# Patient Record
Sex: FEMALE | Race: WHITE
[De-identification: names, ages, dates, MRNs, and addresses within clinical notes are randomized per-mention and may not be internally consistent; named-entity substitution may affect disease eponyms.]

---

## 2017-04-30 ENCOUNTER — HOSPITAL ENCOUNTER (INPATIENT)
Dept: HOSPITAL 47 - EC | Age: 57
LOS: 2 days | Discharge: HOME | DRG: 645 | End: 2017-05-02
Payer: MEDICARE

## 2017-04-30 DIAGNOSIS — G40.909: ICD-10-CM

## 2017-04-30 DIAGNOSIS — E22.2: Primary | ICD-10-CM

## 2017-04-30 DIAGNOSIS — Z90.49: ICD-10-CM

## 2017-04-30 DIAGNOSIS — Z79.899: ICD-10-CM

## 2017-04-30 DIAGNOSIS — F41.9: ICD-10-CM

## 2017-04-30 DIAGNOSIS — F17.200: ICD-10-CM

## 2017-04-30 DIAGNOSIS — F31.9: ICD-10-CM

## 2017-04-30 DIAGNOSIS — Z79.82: ICD-10-CM

## 2017-04-30 DIAGNOSIS — J45.909: ICD-10-CM

## 2017-04-30 LAB
ALP SERPL-CCNC: 69 U/L (ref 38–126)
ALT SERPL-CCNC: 22 U/L (ref 9–52)
ANION GAP SERPL CALC-SCNC: 4 MMOL/L
ANION GAP SERPL CALC-SCNC: 6 MMOL/L
ANION GAP SERPL CALC-SCNC: 8 MMOL/L
AST SERPL-CCNC: 31 U/L (ref 14–36)
BASOPHILS # BLD AUTO: 0.1 K/UL (ref 0–0.2)
BASOPHILS NFR BLD AUTO: 1 %
BUN SERPL-SCNC: 10 MG/DL (ref 7–17)
CALCIUM SPEC-MCNC: 8.8 MG/DL (ref 8.4–10.2)
CH: 34.3
CHCM: 35.7
CHLORIDE SERPL-SCNC: 86 MMOL/L (ref 98–107)
CHLORIDE SERPL-SCNC: 88 MMOL/L (ref 98–107)
CHLORIDE SERPL-SCNC: 91 MMOL/L (ref 98–107)
CO2 SERPL-SCNC: 23 MMOL/L (ref 22–30)
CO2 SERPL-SCNC: 25 MMOL/L (ref 22–30)
CO2 SERPL-SCNC: 26 MMOL/L (ref 22–30)
EOSINOPHIL # BLD AUTO: 0 K/UL (ref 0–0.7)
EOSINOPHIL NFR BLD AUTO: 1 %
ERYTHROCYTE [DISTWIDTH] IN BLOOD BY AUTOMATED COUNT: 3.62 M/UL (ref 3.8–5.4)
ERYTHROCYTE [DISTWIDTH] IN BLOOD: 12.5 % (ref 11.5–15.5)
GLUCOSE SERPL-MCNC: 82 MG/DL (ref 74–99)
HCT VFR BLD AUTO: 34.9 % (ref 34–46)
HDW: 2.2
HGB BLD-MCNC: 12.2 GM/DL (ref 11.4–16)
LUC NFR BLD AUTO: 3 %
LYMPHOCYTES # SPEC AUTO: 1.6 K/UL (ref 1–4.8)
LYMPHOCYTES NFR SPEC AUTO: 34 %
MCH RBC QN AUTO: 33.7 PG (ref 25–35)
MCHC RBC AUTO-ENTMCNC: 35 G/DL (ref 31–37)
MCV RBC AUTO: 96.3 FL (ref 80–100)
MONOCYTES # BLD AUTO: 0.3 K/UL (ref 0–1)
MONOCYTES NFR BLD AUTO: 7 %
NEUTROPHILS # BLD AUTO: 2.5 K/UL (ref 1.3–7.7)
NEUTROPHILS NFR BLD AUTO: 54 %
NON-AFRICAN AMERICAN GFR(MDRD): >60
PH UR: 7 [PH] (ref 5–8)
POTASSIUM SERPL-SCNC: 4.3 MMOL/L (ref 3.5–5.1)
POTASSIUM SERPL-SCNC: 4.5 MMOL/L (ref 3.5–5.1)
POTASSIUM SERPL-SCNC: 4.8 MMOL/L (ref 3.5–5.1)
PROT SERPL-MCNC: 7.2 G/DL (ref 6.3–8.2)
SODIUM SERPL-SCNC: 118 MMOL/L (ref 137–145)
SODIUM SERPL-SCNC: 119 MMOL/L (ref 137–145)
SODIUM SERPL-SCNC: 120 MMOL/L (ref 137–145)
SODIUM UR-SCNC: 23 MMOL/L (ref 30–90)
SP GR UR: 1.01 (ref 1–1.03)
UA BILLING (MACRO VS. MICRO): (no result)
URATE SERPL-MCNC: 1.1 MG/DL (ref 3.7–7.4)
UROBILINOGEN UR QL STRIP: <2 MG/DL (ref ?–2)
WBC # BLD AUTO: 0.14 10*3/UL
WBC # BLD AUTO: 4.6 K/UL (ref 3.8–10.6)
WBC (PEROX): 4.44

## 2017-04-30 PROCEDURE — 83935 ASSAY OF URINE OSMOLALITY: CPT

## 2017-04-30 PROCEDURE — 84300 ASSAY OF URINE SODIUM: CPT

## 2017-04-30 PROCEDURE — 84443 ASSAY THYROID STIM HORMONE: CPT

## 2017-04-30 PROCEDURE — 36415 COLL VENOUS BLD VENIPUNCTURE: CPT

## 2017-04-30 PROCEDURE — 80164 ASSAY DIPROPYLACETIC ACD TOT: CPT

## 2017-04-30 PROCEDURE — 80053 COMPREHEN METABOLIC PANEL: CPT

## 2017-04-30 PROCEDURE — 81003 URINALYSIS AUTO W/O SCOPE: CPT

## 2017-04-30 PROCEDURE — 80051 ELECTROLYTE PANEL: CPT

## 2017-04-30 PROCEDURE — 82570 ASSAY OF URINE CREATININE: CPT

## 2017-04-30 PROCEDURE — 84550 ASSAY OF BLOOD/URIC ACID: CPT

## 2017-04-30 PROCEDURE — 99284 EMERGENCY DEPT VISIT MOD MDM: CPT

## 2017-04-30 PROCEDURE — 85025 COMPLETE CBC W/AUTO DIFF WBC: CPT

## 2017-04-30 RX ADMIN — DIVALPROEX SODIUM SCH MG: 500 TABLET, DELAYED RELEASE ORAL at 21:16

## 2017-04-30 RX ADMIN — QUETIAPINE SCH MG: 200 TABLET, EXTENDED RELEASE ORAL at 22:46

## 2017-04-30 RX ADMIN — CEFAZOLIN SCH MLS/HR: 330 INJECTION, POWDER, FOR SOLUTION INTRAMUSCULAR; INTRAVENOUS at 15:40

## 2017-04-30 NOTE — HP
DATE OF ADMISSION:  04/30/2017



A 57-year-old white female with hyponatremia and weakness. 



HISTORY OF PRESENT ILLNESS: This is a 57-year-old white female, 

history of bipolar disorder and seizure disorder, has been on the same 

medicines for the past 2 years, developed hyponatremia 3 weeks ago. 

Her private doctor sent her to the emergency room today due to 

hyponatremia. Sodium was very low. She has been on fluid restriction. 

She states she drinks 6 to 8 drinks of water a day in big large 

glasses. Denies burning of the urine, trouble with urination.  



MEDICATIONS: 

1. Xanax 1 mg daily. 

2. Trazodone 100 daily. 

3. Depakote 500 b.i.d.

4.  Prozac 10 mg daily. 

5. Linzess 290 daily. 

6. Tramadol 50 mg daily. 



ALLERGIES: CODEINE.



REVIEW OF SYSTEMS: A 14-point review of systems is negative except for 

the weakness.  



PAST MEDICAL HISTORY: 2 packs a day smoking. No alcohol or illicit 

drugs.  



FAMILY HISTORY: Father with hypertension. Mother with macular 

degeneration.  



PHYSICAL EXAM: 

VITAL SIGNS: Stable, afebrile.  Temperature 97, pulse 88, respirations 

18 to 20.  

CARDIOVASCULAR: S1, S2. 

LUNGS: Clear. 

GI: Soft. 

HEMATOLOGIC: Negative Homans. 

EXTREMITIES: No cyanosis, clubbing, edema. 

PSYCHIATRIC: Fair mood and normal affect. 

NEUROLOGIC: Alert and oriented x3. 

VASCULAR: Normal dorsalis pedis, posterior tibial, and radial pulse. 

OPHTHALMOLOGIC: Pupils equal, round and accommodation. She is in no 

acute distress.  



ASSESSMENT: 

1. Hyponatremia. 

2. Weakness secondary to above. 

3. Bipolar disorder. 

4. Seizure disorder. 



PLAN: Slow rehydration with normal saline at 75 an hour. Consult with 

renal physician for hyponatremia. Check labs in the morning.

## 2017-04-30 NOTE — ED
Recheck HPI





- General


Source: patient, RN notes reviewed


Mode of arrival: ambulatory


Limitations: no limitations





<Carina See - Last Filed: 04/30/17 13:44>





<Hao Benitez - Last Filed: 04/30/17 13:56>





- General


Chief Complaint: Recheck/Abnormal Lab/Rx


Stated Complaint: SODIUM LOW


Time Seen by Provider: 04/30/17 11:49





- History of Present Illness


Initial Comments: 





Patient is a 57-year-old female since emergency room for evaluation.  Patient 

states she has not been feeling himself for the past 2 weeks.  Patient states 

out of the primary care provider last Tuesday and was told that her sodium was 

very low.  Patient states she is on fluid restriction had labs redrawn on 

Monday.  Patient states she got a phone call today stating that she needs to 

come to the emergency room because of low sodium.  Patient denies chest pain.  

Patient denies shortness of breath.  Patient denies nausea or vomiting.  

Patient denies abdominal pain.  Patient states she's been experiencing left 

flank pain for the past few weeks and is being on following up with a 

nephrologist.  Patient denies any history of any issues with her kidneys.  She 

denies pain or burning during urination, trouble urinating or blood in urine. (

Carina See)





- Related Data


 Home Medications











 Medication  Instructions  Recorded  Confirmed


 


ALPRAZolam [Xanax] 1 mg PO DAILY PRN 07/17/16 04/30/17


 


traZODone  mg PO HS 07/17/16 04/30/17


 


Divalproex Sodium [Depakote] 500 mg PO BID 04/30/17 04/30/17


 


FLUoxetine HCL [PROzac] 10 mg PO DAILY 04/30/17 04/30/17


 


Linaclotide [Linzess] 290 mcg PO QAM 04/30/17 04/30/17


 


traMADol HCl [Ultram] 50 mg PO DAILY PRN 04/30/17 04/30/17








 Previous Rx's











 Medication  Instructions  Recorded


 


OXcarbazepine [Trileptal] 300 mg PO BID  tab 07/19/16











 Allergies











Allergy/AdvReac Type Severity Reaction Status Date / Time


 


codeine Allergy  Rash/Hives Verified 04/30/17 11:57














Review of Systems


ROS Other: All systems not noted in ROS Statement are negative.





<Carina See - Last Filed: 04/30/17 13:44>


ROS Other: All systems not noted in ROS Statement are negative.





<AparnaHao christine - Last Filed: 04/30/17 13:56>


ROS Statement: 


Those systems with pertinent positive or pertinent negative responses have been 

documented in the HPI.








Past Medical History


Additional Past Medical History / Comment(s): anxiety, depression/bipolar, 

bowel obstruction hx of iatraogenic bowel injury during gynecological case/

adhesions, constipation,emphysema.


History of Any Multi-Drug Resistant Organisms: None Reported


Past Surgical History: Appendectomy, Bowel Resection, Cholecystectomy, 

Hysterectomy


Additional Past Surgical History / Comment(s): rotator cuff repair.


Past Anesthesia/Blood Transfusion Reactions: No Reported Reaction


Past Psychological History: Anxiety, Bipolar, Depression


Additional Psychological History / Comment(s): at time of admission pt denies 

having any problem with depression


Smoking Status: Current every day smoker


Past Alcohol Use History: None Reported


Additional Past Alcohol Use History / Comment(s): pt states she is trying to 

quit smoking and is currently smoking 2 ppd


Past Drug Use History: None Reported


Additional Drug Use History / Comment(s): smoked last 1-21-16





- Past Family History


  ** Father


Family Medical History: Hypertension





  ** Mother


Additional Family Medical History / Comment(s): Macular degeneration





<Carina See - Last Filed: 04/30/17 13:44>





General Exam


Limitations: no limitations


General appearance: alert, in no apparent distress


Head exam: Present: atraumatic, normocephalic, normal inspection


Eye exam: Present: normal appearance


ENT exam: Present: normal exam


Neck exam: Present: normal inspection


Respiratory exam: Present: normal lung sounds bilaterally.  Absent: respiratory 

distress


Cardiovascular Exam: Present: regular rate, normal rhythm, normal heart sounds


GI/Abdominal exam: Present: soft, normal bowel sounds.  Absent: distended, 

tenderness, guarding, rebound, rigid


Extremities exam: Present: normal inspection


Back exam: Present: normal inspection


Neurological exam: Present: alert, oriented X3, CN II-XII intact, normal gait


Psychiatric exam: Present: normal affect, normal mood


Skin exam: Present: warm, dry, intact, normal color.  Absent: rash





<Carina See - Last Filed: 04/30/17 13:44>


General appearance: alert, in no apparent distress


Head exam: Present: atraumatic, normocephalic, normal inspection


Eye exam: Present: normal appearance, PERRL, EOMI.  Absent: scleral icterus, 

conjunctival injection, periorbital swelling


ENT exam: Present: normal exam, mucous membranes moist


Neck exam: Present: normal inspection.  Absent: tenderness, meningismus, 

lymphadenopathy


Respiratory exam: Present: normal lung sounds bilaterally.  Absent: respiratory 

distress, wheezes, rales, rhonchi, stridor


Cardiovascular Exam: Present: regular rate, normal rhythm, normal heart sounds.

  Absent: systolic murmur, diastolic murmur, rubs, gallop, clicks


GI/Abdominal exam: Present: soft, normal bowel sounds.  Absent: distended, 

tenderness, guarding, rebound, rigid


Extremities exam: Present: normal inspection, full ROM, normal capillary 

refill.  Absent: tenderness, pedal edema, joint swelling, calf tenderness


Back exam: Present: normal inspection


Neurological exam: Present: alert, oriented X3, CN II-XII intact


Psychiatric exam: Present: normal affect, normal mood


Skin exam: Present: warm, dry, intact, normal color.  Absent: rash





<Hao Benitez - Last Filed: 04/30/17 13:56>





- General Exam Comments


Initial Comments: 





Sitting in exam room, no distress. (Carina See)





Course





<Carina See - Last Filed: 04/30/17 13:44>





<Hao Benitez - Last Filed: 04/30/17 13:56>





 Vital Signs











  04/30/17





  11:08


 


Temperature 97 F L


 


Pulse Rate 88


 


Respiratory 20





Rate 


 


Blood Pressure 111/7


 


O2 Sat by Pulse 99





Oximetry 














- Reevaluation(s)


Reevaluation #1: 





04/30/17 13:56


Patient remains without complaint, no neurological complaints, weak (Hao Benitez)





Medical Decision Making





- Lab Data


Result diagrams: 


 04/30/17 12:49





 04/30/17 12:49





<Carina See - Last Filed: 04/30/17 13:44>





- Lab Data


Result diagrams: 


 04/30/17 12:49





 04/30/17 12:49





<Hao Benitez - Last Filed: 04/30/17 13:56>





- Medical Decision Making





49 female here for evaluation, patient with a near for evaluation of abnormal 

lab, hyponatremia, patient be admitted for correcting of sodium (Hao Benitez)





- Lab Data





 Lab Results











  04/30/17 04/30/17 04/30/17 Range/Units





  12:49 12:49 12:58 


 


WBC  4.6    (3.8-10.6)  k/uL


 


RBC  3.62 L    (3.80-5.40)  m/uL


 


Hgb  12.2    (11.4-16.0)  gm/dL


 


Hct  34.9    (34.0-46.0)  %


 


MCV  96.3    (80.0-100.0)  fL


 


MCH  33.7    (25.0-35.0)  pg


 


MCHC  35.0    (31.0-37.0)  g/dL


 


RDW  12.5    (11.5-15.5)  %


 


Plt Count  264    (150-450)  k/uL


 


Neutrophils %  54    %


 


Lymphocytes %  34    %


 


Monocytes %  7    %


 


Eosinophils %  1    %


 


Basophils %  1    %


 


Neutrophils #  2.5    (1.3-7.7)  k/uL


 


Lymphocytes #  1.6    (1.0-4.8)  k/uL


 


Monocytes #  0.3    (0-1.0)  k/uL


 


Eosinophils #  0.0    (0-0.7)  k/uL


 


Basophils #  0.1    (0-0.2)  k/uL


 


Sodium   119 L*   (137-145)  mmol/L


 


Potassium   4.8   (3.5-5.1)  mmol/L


 


Chloride   88 L   ()  mmol/L


 


Carbon Dioxide   25   (22-30)  mmol/L


 


Anion Gap   6   mmol/L


 


BUN   10   (7-17)  mg/dL


 


Creatinine   0.66   (0.52-1.04)  mg/dL


 


Est GFR (MDRD) Af Amer   >60   (>60 ml/min/1.73 sqM)  


 


Est GFR (MDRD) Non-Af   >60   (>60 ml/min/1.73 sqM)  


 


Glucose   82   (74-99)  mg/dL


 


Calcium   8.8   (8.4-10.2)  mg/dL


 


Total Bilirubin   0.4   (0.2-1.3)  mg/dL


 


AST   31   (14-36)  U/L


 


ALT   22   (9-52)  U/L


 


Alkaline Phosphatase   69   ()  U/L


 


Total Protein   7.2   (6.3-8.2)  g/dL


 


Albumin   4.0   (3.5-5.0)  g/dL


 


Urine Color    Yellow  


 


Urine Appearance    Clear  (Clear)  


 


Urine pH    7.0  (5.0-8.0)  


 


Ur Specific Gravity    1.009  (1.001-1.035)  


 


Urine Protein    Negative  (Negative)  


 


Urine Glucose (UA)    Negative  (Negative)  


 


Urine Ketones    Trace H  (Negative)  


 


Urine Blood    Negative  (Negative)  


 


Urine Nitrite    Negative  (Negative)  


 


Urine Bilirubin    Negative  (Negative)  


 


Urine Urobilinogen    <2.0  (<2.0)  mg/dL


 


Ur Leukocyte Esterase    Negative  (Negative)  














Disposition


Decision Date: 04/30/17





<Carina See - Last Filed: 04/30/17 13:44>





<Hao Benitez - Last Filed: 04/30/17 13:56>


Clinical Impression: 


 Hyponatremia





Disposition: ADMITTED AS IP TO THIS HOSP


Condition: Stable


Referrals: 


Brian Pruitt MD [Primary Care Provider] - 1-2 days

## 2017-05-01 LAB
ALP SERPL-CCNC: 64 U/L (ref 38–126)
ALP SERPL-CCNC: 94 U/L (ref 38–126)
ALT SERPL-CCNC: 22 U/L (ref 9–52)
ALT SERPL-CCNC: 29 U/L (ref 9–52)
ANION GAP SERPL CALC-SCNC: 10 MMOL/L
ANION GAP SERPL CALC-SCNC: 10 MMOL/L
ANION GAP SERPL CALC-SCNC: 5 MMOL/L
ANION GAP SERPL CALC-SCNC: 7 MMOL/L
AST SERPL-CCNC: 25 U/L (ref 14–36)
AST SERPL-CCNC: 28 U/L (ref 14–36)
BASOPHILS # BLD AUTO: 0 K/UL (ref 0–0.2)
BASOPHILS NFR BLD AUTO: 0 %
BUN SERPL-SCNC: 10 MG/DL (ref 7–17)
BUN SERPL-SCNC: 10 MG/DL (ref 7–17)
CALCIUM SPEC-MCNC: 8.9 MG/DL (ref 8.4–10.2)
CALCIUM SPEC-MCNC: 9.2 MG/DL (ref 8.4–10.2)
CH: 34
CHCM: 34.2
CHLORIDE SERPL-SCNC: 100 MMOL/L (ref 98–107)
CHLORIDE SERPL-SCNC: 102 MMOL/L (ref 98–107)
CHLORIDE SERPL-SCNC: 91 MMOL/L (ref 98–107)
CHLORIDE SERPL-SCNC: 95 MMOL/L (ref 98–107)
CO2 SERPL-SCNC: 20 MMOL/L (ref 22–30)
CO2 SERPL-SCNC: 23 MMOL/L (ref 22–30)
EOSINOPHIL # BLD AUTO: 0.1 K/UL (ref 0–0.7)
EOSINOPHIL NFR BLD AUTO: 1 %
ERYTHROCYTE [DISTWIDTH] IN BLOOD BY AUTOMATED COUNT: 3.9 M/UL (ref 3.8–5.4)
ERYTHROCYTE [DISTWIDTH] IN BLOOD: 13 % (ref 11.5–15.5)
GLUCOSE SERPL-MCNC: 78 MG/DL (ref 74–99)
GLUCOSE SERPL-MCNC: 90 MG/DL (ref 74–99)
HCT VFR BLD AUTO: 39 % (ref 34–46)
HDW: 2.14
HGB BLD-MCNC: 12.9 GM/DL (ref 11.4–16)
LUC NFR BLD AUTO: 2 %
LYMPHOCYTES # SPEC AUTO: 0.9 K/UL (ref 1–4.8)
LYMPHOCYTES NFR SPEC AUTO: 16 %
MCH RBC QN AUTO: 33.1 PG (ref 25–35)
MCHC RBC AUTO-ENTMCNC: 33.2 G/DL (ref 31–37)
MCV RBC AUTO: 99.8 FL (ref 80–100)
MONOCYTES # BLD AUTO: 0.3 K/UL (ref 0–1)
MONOCYTES NFR BLD AUTO: 5 %
NEUTROPHILS # BLD AUTO: 4.4 K/UL (ref 1.3–7.7)
NEUTROPHILS NFR BLD AUTO: 77 %
NON-AFRICAN AMERICAN GFR(MDRD): >60
NON-AFRICAN AMERICAN GFR(MDRD): >60
POTASSIUM SERPL-SCNC: 4.3 MMOL/L (ref 3.5–5.1)
POTASSIUM SERPL-SCNC: 4.6 MMOL/L (ref 3.5–5.1)
POTASSIUM SERPL-SCNC: 4.8 MMOL/L (ref 3.5–5.1)
POTASSIUM SERPL-SCNC: 5.1 MMOL/L (ref 3.5–5.1)
PROT SERPL-MCNC: 6.3 G/DL (ref 6.3–8.2)
PROT SERPL-MCNC: 7.2 G/DL (ref 6.3–8.2)
SODIUM SERPL-SCNC: 119 MMOL/L (ref 137–145)
SODIUM SERPL-SCNC: 125 MMOL/L (ref 137–145)
SODIUM SERPL-SCNC: 132 MMOL/L (ref 137–145)
SODIUM SERPL-SCNC: 133 MMOL/L (ref 137–145)
WBC # BLD AUTO: 0.09 10*3/UL
WBC # BLD AUTO: 5.7 K/UL (ref 3.8–10.6)
WBC (PEROX): 5.46

## 2017-05-01 RX ADMIN — DOCUSATE SODIUM SCH MG: 100 CAPSULE, LIQUID FILLED ORAL at 20:50

## 2017-05-01 RX ADMIN — DIVALPROEX SODIUM SCH MG: 500 TABLET, DELAYED RELEASE ORAL at 09:09

## 2017-05-01 RX ADMIN — QUETIAPINE SCH MG: 200 TABLET, EXTENDED RELEASE ORAL at 22:07

## 2017-05-01 RX ADMIN — DIVALPROEX SODIUM SCH MG: 500 TABLET, DELAYED RELEASE ORAL at 20:50

## 2017-05-01 RX ADMIN — DOCUSATE SODIUM SCH MG: 100 CAPSULE, LIQUID FILLED ORAL at 13:03

## 2017-05-01 RX ADMIN — CEFAZOLIN SCH MLS/HR: 330 INJECTION, POWDER, FOR SOLUTION INTRAMUSCULAR; INTRAVENOUS at 06:58

## 2017-05-01 NOTE — CONS
DATE OF CONSULTATION:  05/01/2017 



REASON FOR CONSULT:  Hyponatremia. 



HISTORY OF PRESENT ILLNESS: Patient is a 57-year-old female with a 

history of seizures, maintained on Depakote and Trileptal. Patient was 

admitted to the hospital for abnormally low sodium done as outpatient 

through her primary care physician's office. Patient stated she had 

been feeling slightly weak prior to her admission, she did state that 

she has had low sodium levels on and off previously. Her sodium was at 

119 mEq/L at the time of admission. Patient did get normal saline and 

she had worsened.  Her urine osmolality was on the high side at 340 

and patient did get a dose of tolvaptan around 6 in the morning. She 

is currently not on any IV fluids and her sodium came up to 125 mEq/L. 

 



PAST MEDICAL HISTORY: Anxiety, depression, bipolar disorder, history 

of bowel obstruction, constipation.  



PAST SURGICAL HISTORY: Appendectomy, bowel resection, cholecystectomy, 

hysterectomy, rotator cuff repair.  



SOCIAL HISTORY: Positive for smoking, no history of drug abuse or 

alcohol abuse.  



Medications at home included Xanax, trazodone, Depakote, Prozac, 

Linzess, Ultram, Trileptal.  



ALLERGIES: None. 



REVIEW OF SYSTEMS: As per day HPI, other systems negative. 



On examination, patient is comfortable, awake, alert, oriented x3, not 

in any acute distress.  

Blood pressure is 128/76, heart rate 73 per minute. She is afebrile. 

Examination of the heart S1 and S2. Examination of the lungs, 

bilateral breath sounds are heard.  

Abdomen is soft, nontender. Examination of the lower extremities shows 

no evidence of edema. CNS exam is grossly intact.  



Labs show sodium up to 125, potassium 4.8. Hemoglobin 12.9 g/dL. 



ASSESSMENT: 

1. Euvolemic hyponatremia secondary to syndrome of inappropriate 

antidiuretic hormone secretion, status post one dose of Samsca, 

currently significantly improved.  I will hold off on significant 

fluid restriction since patient just got the Samsca this morning.  At 

the time of discharge she should be placed again on fluid restriction 

and she may need small dose of demeclocycline as outpatient. We will 

repeat another sodium level in 6 hours time and hopefully patient can 

be discharged by tomorrow.  

2. Asthma, currently controlled. 

3. History of constipation and history of bowel obstruction, currently 

stable.  



PLAN: Repeat sodium in about 6 hours avoid.  Avoid strict fluid 

restriction in the setting of Samsca to avoid rapid increase in serum 

sodium level and hopefully patient can be discharged by tomorrow.  



Thank you for this consultation. Will continue to follow the patient 

with you during her hospitalization.

## 2017-05-02 VITALS
SYSTOLIC BLOOD PRESSURE: 101 MMHG | TEMPERATURE: 97.4 F | RESPIRATION RATE: 20 BRPM | DIASTOLIC BLOOD PRESSURE: 56 MMHG | HEART RATE: 90 BPM

## 2017-05-02 LAB
ANION GAP SERPL CALC-SCNC: 12 MMOL/L
CHLORIDE SERPL-SCNC: 103 MMOL/L (ref 98–107)
CO2 SERPL-SCNC: 18 MMOL/L (ref 22–30)
POTASSIUM SERPL-SCNC: 4.6 MMOL/L (ref 3.5–5.1)
SODIUM SERPL-SCNC: 133 MMOL/L (ref 137–145)

## 2017-05-02 RX ADMIN — DOCUSATE SODIUM SCH MG: 100 CAPSULE, LIQUID FILLED ORAL at 08:17

## 2017-05-02 RX ADMIN — DIVALPROEX SODIUM SCH MG: 500 TABLET, DELAYED RELEASE ORAL at 08:17

## 2017-05-02 NOTE — PN
SUBJECTIVE: A 57-year-old white female who was admitted with euvolemic 

hyponatremia secondary to SIADH, status post Samsca.  The patient will 

probably go home tomorrow. She is feeling less fatigued. She will go 

home on demeclocycline as an outpatient. Asthma is currently 

controlled. History constipation and bowel obstruction, stable. Fluid 

restriction. She will go home on medicine to keep her sodium up.

## 2017-05-06 ENCOUNTER — HOSPITAL ENCOUNTER (INPATIENT)
Dept: HOSPITAL 47 - EC | Age: 57
LOS: 3 days | Discharge: HOME | DRG: 644 | End: 2017-05-09
Payer: MEDICARE

## 2017-05-06 VITALS — BODY MASS INDEX: 24.3 KG/M2

## 2017-05-06 DIAGNOSIS — F31.9: ICD-10-CM

## 2017-05-06 DIAGNOSIS — Z79.82: ICD-10-CM

## 2017-05-06 DIAGNOSIS — J43.9: ICD-10-CM

## 2017-05-06 DIAGNOSIS — Z79.899: ICD-10-CM

## 2017-05-06 DIAGNOSIS — Z82.49: ICD-10-CM

## 2017-05-06 DIAGNOSIS — E86.1: ICD-10-CM

## 2017-05-06 DIAGNOSIS — E22.2: Primary | ICD-10-CM

## 2017-05-06 DIAGNOSIS — F17.210: ICD-10-CM

## 2017-05-06 DIAGNOSIS — E87.6: ICD-10-CM

## 2017-05-06 DIAGNOSIS — Z88.5: ICD-10-CM

## 2017-05-06 DIAGNOSIS — G40.919: ICD-10-CM

## 2017-05-06 LAB
ALP SERPL-CCNC: 93 U/L (ref 38–126)
ALT SERPL-CCNC: 21 U/L (ref 9–52)
ANION GAP SERPL CALC-SCNC: 11 MMOL/L
AST SERPL-CCNC: 26 U/L (ref 14–36)
BASOPHILS # BLD AUTO: 0 K/UL (ref 0–0.2)
BASOPHILS NFR BLD AUTO: 1 %
BUN SERPL-SCNC: 10 MG/DL (ref 7–17)
CALCIUM SPEC-MCNC: 9 MG/DL (ref 8.4–10.2)
CH: 33.8
CHCM: 34.8
CHLORIDE SERPL-SCNC: 91 MMOL/L (ref 98–107)
CO2 SERPL-SCNC: 20 MMOL/L (ref 22–30)
EOSINOPHIL # BLD AUTO: 0 K/UL (ref 0–0.7)
EOSINOPHIL NFR BLD AUTO: 1 %
ERYTHROCYTE [DISTWIDTH] IN BLOOD BY AUTOMATED COUNT: 3.74 M/UL (ref 3.8–5.4)
ERYTHROCYTE [DISTWIDTH] IN BLOOD: 12.7 % (ref 11.5–15.5)
GLUCOSE SERPL-MCNC: 87 MG/DL (ref 74–99)
HCT VFR BLD AUTO: 36.5 % (ref 34–46)
HDW: 2.13
HGB BLD-MCNC: 12.2 GM/DL (ref 11.4–16)
LUC NFR BLD AUTO: 3 %
LYMPHOCYTES # SPEC AUTO: 1.5 K/UL (ref 1–4.8)
LYMPHOCYTES NFR SPEC AUTO: 29 %
MAGNESIUM SPEC-SCNC: 1.5 MG/DL (ref 1.6–2.3)
MCH RBC QN AUTO: 32.6 PG (ref 25–35)
MCHC RBC AUTO-ENTMCNC: 33.4 G/DL (ref 31–37)
MCV RBC AUTO: 97.4 FL (ref 80–100)
MONOCYTES # BLD AUTO: 0.3 K/UL (ref 0–1)
MONOCYTES NFR BLD AUTO: 6 %
NEUTROPHILS # BLD AUTO: 3.1 K/UL (ref 1.3–7.7)
NEUTROPHILS NFR BLD AUTO: 61 %
NON-AFRICAN AMERICAN GFR(MDRD): >60
PARTICLE COUNT: 657
PH UR: 6.5 [PH] (ref 5–8)
PHOSPHATE SERPL-MCNC: 3.5 MG/DL (ref 2.5–4.5)
POTASSIUM SERPL-SCNC: 4.2 MMOL/L (ref 3.5–5.1)
PROT SERPL-MCNC: 7.2 G/DL (ref 6.3–8.2)
RBC UR QL: 1 /HPF (ref 0–5)
SODIUM SERPL-SCNC: 122 MMOL/L (ref 137–145)
SP GR UR: 1 (ref 1–1.03)
SQUAMOUS UR QL AUTO: 1 /HPF (ref 0–4)
UA BILLING (MACRO VS. MICRO): (no result)
UROBILINOGEN UR QL STRIP: <2 MG/DL (ref ?–2)
WBC # BLD AUTO: 0.17 10*3/UL
WBC # BLD AUTO: 5.1 K/UL (ref 3.8–10.6)
WBC #/AREA URNS HPF: 2 /HPF (ref 0–5)
WBC (PEROX): 5.24

## 2017-05-06 PROCEDURE — 84100 ASSAY OF PHOSPHORUS: CPT

## 2017-05-06 PROCEDURE — 96360 HYDRATION IV INFUSION INIT: CPT

## 2017-05-06 PROCEDURE — 80165 DIPROPYLACETIC ACID FREE: CPT

## 2017-05-06 PROCEDURE — 96361 HYDRATE IV INFUSION ADD-ON: CPT

## 2017-05-06 PROCEDURE — 99285 EMERGENCY DEPT VISIT HI MDM: CPT

## 2017-05-06 PROCEDURE — 84439 ASSAY OF FREE THYROXINE: CPT

## 2017-05-06 PROCEDURE — 84481 FREE ASSAY (FT-3): CPT

## 2017-05-06 PROCEDURE — 80053 COMPREHEN METABOLIC PANEL: CPT

## 2017-05-06 PROCEDURE — 84443 ASSAY THYROID STIM HORMONE: CPT

## 2017-05-06 PROCEDURE — 84300 ASSAY OF URINE SODIUM: CPT

## 2017-05-06 PROCEDURE — 80164 ASSAY DIPROPYLACETIC ACD TOT: CPT

## 2017-05-06 PROCEDURE — 83735 ASSAY OF MAGNESIUM: CPT

## 2017-05-06 PROCEDURE — 95816 EEG AWAKE AND DROWSY: CPT

## 2017-05-06 PROCEDURE — 80183 DRUG SCRN QUANT OXCARBAZEPIN: CPT

## 2017-05-06 PROCEDURE — 36415 COLL VENOUS BLD VENIPUNCTURE: CPT

## 2017-05-06 PROCEDURE — 85025 COMPLETE CBC W/AUTO DIFF WBC: CPT

## 2017-05-06 PROCEDURE — 83930 ASSAY OF BLOOD OSMOLALITY: CPT

## 2017-05-06 PROCEDURE — 81001 URINALYSIS AUTO W/SCOPE: CPT

## 2017-05-06 PROCEDURE — 83935 ASSAY OF URINE OSMOLALITY: CPT

## 2017-05-06 PROCEDURE — 84295 ASSAY OF SERUM SODIUM: CPT

## 2017-05-06 RX ADMIN — DEMECLOCYCLINE SCH: 150 TABLET ORAL at 21:43

## 2017-05-06 RX ADMIN — DIVALPROEX SODIUM SCH MG: 500 TABLET, DELAYED RELEASE ORAL at 21:43

## 2017-05-06 RX ADMIN — CEFAZOLIN SCH MLS/HR: 330 INJECTION, POWDER, FOR SOLUTION INTRAMUSCULAR; INTRAVENOUS at 16:12

## 2017-05-06 RX ADMIN — CEFAZOLIN SCH MLS/HR: 330 INJECTION, POWDER, FOR SOLUTION INTRAMUSCULAR; INTRAVENOUS at 22:46

## 2017-05-06 NOTE — DS
DATE OF ADMISSION:   04/30/2017

DATE OF DISCHARGE:   05/02/2017





DISCHARGE MEDICATIONS: 

1. Trazodone 100 at bedtime. 

2. Xanax 1 mg daily. 

3. Trileptal 300 b.i.d. 

4. Fluoxetine 10 mg daily. 

5. Ultram 50 mg daily. 

6. Linzess 290 mcg daily. 

7. Depakote 500 mg b.i.d. 

8. Aspirin 81 mg daily. 



CONDITION: Stable. 



PROGNOSIS: Guarded. 



DISCHARGE DIAGNOSES: 

1. Hyponatremia. 

2. Seizure disorder. 

3. Euvolemic hyponatremia. 

4. Asthma. 

5. History of constipation and bowel obstruction. 



HOSPITAL COURSE OF EVENTS: The patient was seen by renal physician for 

significant hyponatremia. Diagnosed with SIADH. She is to avoid strict 

fluid resuscitation in setting of (    ). The patient was stabilized 

and sent home in stable condition. Follow up as an outpatient.

## 2017-05-06 NOTE — ED
General Adult HPI





- General


Chief complaint: Recheck/Abnormal Lab/Rx


Stated complaint: Abnormal Labs


Time Seen by Provider: 05/06/17 13:33


Source: patient, RN notes reviewed


Mode of arrival: ambulatory


Limitations: no limitations





- History of Present Illness


Initial comments: 





Patient is a pleasant 57-year-old female presenting to the emergency department 

complaining of reported hyponatremia.  Patient has mild fatigue otherwise no 

complaints.  Patient states it is not too concerning.  Patient states she did 

have routine blood work done and was told her sodium was 116.  Patient was just 

discharged from the hospital within the past couple of weeks for hyponatremia.  

Patient states she does take a lot of water however has been cutting back.  

Patient and she is on Trileptal and Depakote for history of seizure however is 

unclear if she needs to still be on them.  Patient states her seizures were 

associated with benzodiazepine use and withdrawal previously.





- Related Data


 Home Medications











 Medication  Instructions  Recorded  Confirmed


 


ALPRAZolam [Xanax] 1 mg PO DAILY PRN 07/17/16 05/06/17


 


traZODone  mg PO HS 07/17/16 05/06/17


 


Divalproex Sodium [Depakote] 500 mg PO BID 04/30/17 05/06/17


 


Linaclotide [Linzess] 290 mcg PO QAM 04/30/17 05/06/17


 


traMADol HCl [Ultram] 50 mg PO DAILY PRN 04/30/17 05/06/17


 


Aspirin [Adult Low Dose Aspirin EC] 81 mg PO DAILY 05/02/17 05/06/17


 


L.acidoph,Paracasei, B.lactis 1 cap PO DAILY 05/06/17 05/06/17





[Probiotic]   


 


Multivitamins, Thera [Multivitamin 1 tab PO DAILY 05/06/17 05/06/17





(formulary)]   


 


Naproxen Sodium [Aleve] 220 mg PO Q12HR PRN 05/06/17 05/06/17


 


QUEtiapine FUMARATE [SEROquel] 300 mg PO HS 05/06/17 05/06/17








 Previous Rx's











 Medication  Instructions  Recorded


 


OXcarbazepine [Trileptal] 300 mg PO BID  tab 07/19/16











 Allergies











Allergy/AdvReac Type Severity Reaction Status Date / Time


 


codeine Allergy  Rash/Hives Verified 05/06/17 14:05














Review of Systems


ROS Statement: 


Those systems with pertinent positive or pertinent negative responses have been 

documented in the HPI.





ROS Other: All systems not noted in ROS Statement are negative.


Constitutional: Denies: fever


Eyes: Denies: eye pain


ENT: Denies: ear pain


Respiratory: Denies: cough


Cardiovascular: Denies: chest pain


Endocrine: Reports: fatigue


Gastrointestinal: Denies: abdominal pain


Genitourinary: Denies: dysuria


Musculoskeletal: Denies: back pain


Skin: Denies: rash


Neurological: Denies: weakness





Past Medical History


Additional Past Medical History / Comment(s): anxiety, depression/bipolar, 

bowel obstruction hx of iatraogenic bowel injury during gynecological case/

adhesions, constipation,emphysema.


History of Any Multi-Drug Resistant Organisms: None Reported


Past Surgical History: Appendectomy, Bowel Resection, Cholecystectomy, 

Hysterectomy


Additional Past Surgical History / Comment(s): rotator cuff repair.


Past Anesthesia/Blood Transfusion Reactions: No Reported Reaction


Past Psychological History: Anxiety, Bipolar, Depression


Additional Psychological History / Comment(s): at time of admission pt denies 

having any problem with depression


Smoking Status: Current every day smoker


Past Alcohol Use History: None Reported


Additional Past Alcohol Use History / Comment(s): pt states she is trying to 

quit smoking and is currently smoking 2 ppd


Past Drug Use History: None Reported


Additional Drug Use History / Comment(s): smoked last 1-21-16





- Past Family History


  ** Father


Family Medical History: Hypertension





  ** Mother


Additional Family Medical History / Comment(s): Macular degeneration





General Exam


Limitations: no limitations


General appearance: alert, in no apparent distress


Head exam: Present: atraumatic


Eye exam: Present: normal appearance, PERRL


ENT exam: Present: normal oropharynx


Neck exam: Present: normal inspection


Respiratory exam: Present: normal lung sounds bilaterally


Cardiovascular Exam: Present: regular rate, normal rhythm


GI/Abdominal exam: Present: soft.  Absent: tenderness


Extremities exam: Present: normal inspection.  Absent: pedal edema, calf 

tenderness


Neurological exam: Present: alert, CN II-XII intact.  Absent: motor sensory 

deficit


  ** Expanded


Cranial nerves: EOM's Intact: Normal


Motor strength exam: RUE: 5, LUE: 5, RLE: 5, LLE: 5


Psychiatric exam: Present: normal affect, normal mood


Skin exam: Present: normal color





Course


 Vital Signs











  05/06/17 05/06/17





  12:56 14:40


 


Temperature 97.8 F 


 


Pulse Rate 88 79


 


Respiratory 20 18





Rate  


 


Blood Pressure 130/70 131/74


 


O2 Sat by Pulse 99 97





Oximetry  














Medical Decision Making





- Medical Decision Making





Patient reevaluated and resting comfortably in bed.  Case discussed in detail 

with Dr. Ventura who did recently discharged patient.  He will readmit with 

consult for nephrology.





- Lab Data


Result diagrams: 


 05/06/17 14:27





 05/06/17 14:27


 Lab Results











  05/06/17 05/06/17 Range/Units





  14:27 14:27 


 


WBC  5.1   (3.8-10.6)  k/uL


 


RBC  3.74 L   (3.80-5.40)  m/uL


 


Hgb  12.2   (11.4-16.0)  gm/dL


 


Hct  36.5   (34.0-46.0)  %


 


MCV  97.4   (80.0-100.0)  fL


 


MCH  32.6   (25.0-35.0)  pg


 


MCHC  33.4   (31.0-37.0)  g/dL


 


RDW  12.7   (11.5-15.5)  %


 


Plt Count  284   (150-450)  k/uL


 


Neutrophils %  61   %


 


Lymphocytes %  29   %


 


Monocytes %  6   %


 


Eosinophils %  1   %


 


Basophils %  1   %


 


Neutrophils #  3.1   (1.3-7.7)  k/uL


 


Lymphocytes #  1.5   (1.0-4.8)  k/uL


 


Monocytes #  0.3   (0-1.0)  k/uL


 


Eosinophils #  0.0   (0-0.7)  k/uL


 


Basophils #  0.0   (0-0.2)  k/uL


 


Sodium   122 L  (137-145)  mmol/L


 


Potassium   4.2  (3.5-5.1)  mmol/L


 


Chloride   91 L  ()  mmol/L


 


Carbon Dioxide   20 L  (22-30)  mmol/L


 


Anion Gap   11  mmol/L


 


BUN   10  (7-17)  mg/dL


 


Creatinine   0.60  (0.52-1.04)  mg/dL


 


Est GFR (MDRD) Af Amer   >60  (>60 ml/min/1.73 sqM)  


 


Est GFR (MDRD) Non-Af   >60  (>60 ml/min/1.73 sqM)  


 


Glucose   87  (74-99)  mg/dL


 


Calcium   9.0  (8.4-10.2)  mg/dL


 


Phosphorus   3.5  (2.5-4.5)  mg/dL


 


Magnesium   1.5 L  (1.6-2.3)  mg/dL


 


Total Bilirubin   0.3  (0.2-1.3)  mg/dL


 


AST   26  (14-36)  U/L


 


ALT   21  (9-52)  U/L


 


Alkaline Phosphatase   93  ()  U/L


 


Total Protein   7.2  (6.3-8.2)  g/dL


 


Albumin   4.0  (3.5-5.0)  g/dL


 


TSH   0.668  (0.465-4.680)  mIU/L


 


Free T4   0.80  (0.78-2.19)  ng/dL


 


Free T3 pg/mL   2.8  (2.8-5.3)  pg/ml














Disposition


Clinical Impression: 


 Hyponatremia





Disposition: ADMITTED AS IP TO THIS [facility]


Time of Disposition: 15:32

## 2017-05-07 LAB
ALP SERPL-CCNC: 69 U/L (ref 38–126)
ALT SERPL-CCNC: 19 U/L (ref 9–52)
ANION GAP SERPL CALC-SCNC: 9 MMOL/L
AST SERPL-CCNC: 24 U/L (ref 14–36)
BASOPHILS # BLD AUTO: 0 K/UL (ref 0–0.2)
BASOPHILS NFR BLD AUTO: 0 %
BUN SERPL-SCNC: 8 MG/DL (ref 7–17)
CALCIUM SPEC-MCNC: 8.9 MG/DL (ref 8.4–10.2)
CH: 34
CHCM: 33.7
CHLORIDE SERPL-SCNC: 99 MMOL/L (ref 98–107)
CO2 SERPL-SCNC: 20 MMOL/L (ref 22–30)
EOSINOPHIL # BLD AUTO: 0.1 K/UL (ref 0–0.7)
EOSINOPHIL NFR BLD AUTO: 1 %
ERYTHROCYTE [DISTWIDTH] IN BLOOD BY AUTOMATED COUNT: 3.53 M/UL (ref 3.8–5.4)
ERYTHROCYTE [DISTWIDTH] IN BLOOD: 12.9 % (ref 11.5–15.5)
GLUCOSE SERPL-MCNC: 82 MG/DL (ref 74–99)
HCT VFR BLD AUTO: 35.7 % (ref 34–46)
HDW: 2.1
HGB BLD-MCNC: 11.8 GM/DL (ref 11.4–16)
LUC NFR BLD AUTO: 2 %
LYMPHOCYTES # SPEC AUTO: 1.2 K/UL (ref 1–4.8)
LYMPHOCYTES NFR SPEC AUTO: 21 %
MAGNESIUM SPEC-SCNC: 1.7 MG/DL (ref 1.6–2.3)
MCH RBC QN AUTO: 33.4 PG (ref 25–35)
MCHC RBC AUTO-ENTMCNC: 33 G/DL (ref 31–37)
MCV RBC AUTO: 101.1 FL (ref 80–100)
MONOCYTES # BLD AUTO: 0.3 K/UL (ref 0–1)
MONOCYTES NFR BLD AUTO: 5 %
NEUTROPHILS # BLD AUTO: 4.1 K/UL (ref 1.3–7.7)
NEUTROPHILS NFR BLD AUTO: 70 %
NON-AFRICAN AMERICAN GFR(MDRD): >60
POTASSIUM SERPL-SCNC: 4.8 MMOL/L (ref 3.5–5.1)
PROT SERPL-MCNC: 6.8 G/DL (ref 6.3–8.2)
SODIUM SERPL-SCNC: 128 MMOL/L (ref 137–145)
WBC # BLD AUTO: 0.11 10*3/UL
WBC # BLD AUTO: 5.9 K/UL (ref 3.8–10.6)
WBC (PEROX): 6.28

## 2017-05-07 RX ADMIN — DEMECLOCYCLINE SCH MG: 150 TABLET ORAL at 20:18

## 2017-05-07 RX ADMIN — DIVALPROEX SODIUM SCH MG: 500 TABLET, DELAYED RELEASE ORAL at 20:18

## 2017-05-07 RX ADMIN — MAGNESIUM SULFATE IN DEXTROSE SCH MLS/HR: 10 INJECTION, SOLUTION INTRAVENOUS at 10:51

## 2017-05-07 RX ADMIN — ASPIRIN 81 MG CHEWABLE TABLET SCH MG: 81 TABLET CHEWABLE at 07:21

## 2017-05-07 RX ADMIN — ASPIRIN SCH: 325 TABLET ORAL at 10:50

## 2017-05-07 RX ADMIN — MAGNESIUM SULFATE IN DEXTROSE SCH MLS/HR: 10 INJECTION, SOLUTION INTRAVENOUS at 12:13

## 2017-05-07 RX ADMIN — DIVALPROEX SODIUM SCH MG: 500 TABLET, DELAYED RELEASE ORAL at 07:21

## 2017-05-07 RX ADMIN — DEMECLOCYCLINE SCH MG: 150 TABLET ORAL at 07:22

## 2017-05-07 RX ADMIN — CEFAZOLIN SCH MLS/HR: 330 INJECTION, POWDER, FOR SOLUTION INTRAMUSCULAR; INTRAVENOUS at 16:17

## 2017-05-07 RX ADMIN — LACTOBACILLUS ACIDOPHILUS / LACTOBACILLUS BULGARICUS SCH EACH: 100 MILLION CFU STRENGTH GRANULES at 07:21

## 2017-05-07 RX ADMIN — THERA TABS SCH EACH: TAB at 12:14

## 2017-05-07 NOTE — HP
DATE OF ADMISSION:  05/06/2017





CHIEF COMPLAINT: 57-year-old white female comes in with severe 

hyponatremia recurrence,  discussed the case with nurses, talked to 

the patient about seizure medications, probably hyponatremia. The 

patient is willing to switch her seizure medications. We will consult 

a seizure doctor at this time. She was diagnosed 2 weeks ago with 

SIADH and was discharged home past Friday.  Serum sodium was 116 at 

that time. Now she comes back and is still extremely low. She has been 

taking Trileptal 300 b.i.d.  

Depakote 500 b.i.d. history of bipolar disorder, multiple medications 

for hyponatremia, placed on 0.9 saline.  Sodium was 128.  She had a 

withdrawal seizure due to benzodiazepine abuse in the past. She is not 

sure if she (    ) seizure medication at this time.   EEG will be 

performed to rule out seizure disorder. Monitor sodium and put her on 

declomeclizine which is indicated by renal physician on the last 

admission.  



REVIEW OF SYSTEMS:  CONSTITUTIONAL: Weakness, fatigue otherwise 

fourteen-point review of systems negative.  



PAST MEDICAL HISTORY: Anxiety, depression, bipolar, bowel obstruction, 

history of iatrogenic bowel during gynecologic case with adhesions, 

constipation, emphysema.  



SURGERIES: Appendectomy, bowel resection, cholecystectomy, 

hysterectomy, rotator cuff repair, anxiety, bipolar depression. 



This is a current every day smoker. No alcohol. No illicit drugs. 

Smokes 2 packs a day.  



FAMILY HISTORY: Father hypertension. Mother macular degeneration.



Home Medications:

1. Xanax 1 mg p.r.n. 

2. (     ) 290 mcg daily. 

3. Depakote 500 b.i.d. 

4. Trazodone 200 q.h.s. 

5. Tramadol 50 daily p.r.n. for pain. 

6. Aspirin 81 mg a day.

7. Probiotics 1 daily. 

8. Multivitamin 1 daily. 

9. Naprosyn 220 q.12 hours. 

10. Seroquel 300 q.h.s. 



ALLERGIES: CODEINE. 



PHYSICAL EXAM:

VITAL SIGNS: Temperature 97.6, pulse 70s to 90s, respiratory rate is 

16, blood pressure 151 to 130s over 60s to 70s. O2 95% to 97% on room 

air.  

CARDIOVASCULAR: S1, S2. 

LUNGS: Transmitted upper airway sounds. 

GI: Soft. 

HEMATOLOGIC: Negative Homans. 

PSYCH: Fair mood and affect. 

NEUROLOGIC: Alert and oriented x3. Ophthalmologic: Pupils equal, round 

and react to light and accommodation.  



Sodium 128, potassium 4.8.   



IMPRESSION:  

1. Hyponatremia, acute. 

2. Seizure disorder, acute. 

3. Bipolar disorder. 

4. Hypokalemia. 



Weaning the Trileptal.   Continue Depakote.

## 2017-05-07 NOTE — P.NPCON
History of Present Illness





- Reason for Consult


hyponatremia





- History of Present Illness





Reason for consultation: Hyponatremia





History of present illness:


Patient is a 57-year-old  female seen in renal consultation for 

hyponatremia.  Patient was recently admitted to [facility] 

earlier this month for hyponatremia and at that time her sodium was as low as 

119.  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 

well.  Patient was subsequently discharged home and had blood work done as an 

outpatient on Friday.  Her sodium level came back at 116 and I advised her to 

go back to the hospital.  When she came to the hospital last night her sodium 

level was 122.  She was started on 0.9 saline at 75 mL an hour and sodium level 

is up to 128 this morning.  Hemodynamically she is able.  Denies chest pain or 

shortness of breath.  Appetite is good.  Does admit to drinking quite a bit of 

fluids but did cut back since her last shot.  No vomiting or diarrhea.  He does 

have history of seizures and is maintained on Trileptal as well as Depakote.  

She is also on demeclocycline.  No other complaints at this time.  Admits to 

good urine output.  No hematuria or dysuria.





Vital signs are stable.


General: The patient appeared well nourished and normally developed. 


HEENT: Head exam is unremarkable. Neck is without jugular venous distension.


LUNGS: Lungs are clear to auscultation and percussion. Breath sounds decreased.


HEART: Rate and Rhythm are regular. First and second heart sounds normal. No 

murmurs, rubs or gallops. 


ABDOMEN: Abdominal exam reveals normal bowel sounds. Non-tender and non-

distended. No evidence of peritonitis.


EXTREMITITES: No clubbing, cyanosis, or edema.





Past Medical History


Additional Past Medical History / Comment(s): anxiety, depression/bipolar, 

bowel obstruction hx of iatraogenic bowel injury during gynecological case/

adhesions, constipation,emphysema.


History of Any Multi-Drug Resistant Organisms: None Reported


Past Surgical History: Appendectomy, Bowel Resection, Cholecystectomy, 

Hysterectomy


Additional Past Surgical History / Comment(s): rotator cuff repair.


Past Anesthesia/Blood Transfusion Reactions: No Reported Reaction


Past Psychological History: Anxiety, Bipolar, Depression


Additional Psychological History / Comment(s): at time of admission pt denies 

having any problem with depression


Smoking Status: Current every day smoker


Past Alcohol Use History: None Reported


Additional Past Alcohol Use History / Comment(s): pt states she is trying to 

quit smoking and is currently smoking 2 ppd


Past Drug Use History: None Reported


Additional Drug Use History / Comment(s): smoked last 1-21-16





- Past Family History


  ** Father


Family Medical History: Hypertension





  ** Mother


Additional Family Medical History / Comment(s): Macular degeneration





Medications and Allergies


 Home Medications











 Medication  Instructions  Recorded  Confirmed  Type


 


ALPRAZolam [Xanax] 1 mg PO DAILY PRN 07/17/16 05/06/17 History


 


traZODone  mg PO HS 07/17/16 05/06/17 History


 


Divalproex Sodium [Depakote] 500 mg PO BID 04/30/17 05/06/17 History


 


Linaclotide [Linzess] 290 mcg PO QAM 04/30/17 05/06/17 History


 


traMADol HCl [Ultram] 50 mg PO DAILY PRN 04/30/17 05/06/17 History


 


Aspirin [Adult Low Dose Aspirin EC] 81 mg PO DAILY 05/02/17 05/06/17 History


 


L.acidoph,Paracasei, B.lactis 1 cap PO DAILY 05/06/17 05/06/17 History





[Probiotic]    


 


Multivitamins, Thera [Multivitamin 1 tab PO DAILY 05/06/17 05/06/17 History





(formulary)]    


 


Naproxen Sodium [Aleve] 220 mg PO Q12HR PRN 05/06/17 05/06/17 History


 


QUEtiapine FUMARATE [SEROquel] 300 mg PO HS 05/06/17 05/06/17 History











 Allergies











Allergy/AdvReac Type Severity Reaction Status Date / Time


 


codeine Allergy  Rash/Hives Verified 05/06/17 17:23














Physical Exam


Vitals: 


 Vital Signs











  Temp Pulse Resp BP Pulse Ox


 


 05/07/17 07:29   83  16  


 


 05/07/17 07:00  97.6 F  83  16  119/62  97


 


 05/07/17 04:00   86  12  


 


 05/07/17 00:00   86  12  


 


 05/06/17 23:00  97.4 F L  86  12  128/71  95


 


 05/06/17 20:00   79  16  


 


 05/06/17 17:01  97.7 F  79  16  137/89  96








 Intake and Output











 05/06/17 05/07/17 05/07/17





 22:59 06:59 14:59


 


Intake Total 260 600 250


 


Balance 260 600 250


 


Intake:   


 


  Intake, IV Titration 260 600 





  Amount   


 


    Sodium Chloride 0.9% 1, 260 600 





    000 ml @ 75 mls/hr IV .   





    [id number] [facility] Rx#:[id number]   


 


  Oral   250


 


Other:   


 


  Weight 58.5 kg 58.5 kg 58.5 kg








 Patient Weight











 05/08/17





 06:59


 


Weight 58.5 kg














Results





- Lab Results


 Most recent lab results











Calcium  8.9 mg/dL (8.4-10.2)   05/07/17  07:23    


 


Phosphorus  3.5 mg/dL (2.5-4.5)   05/06/17  14:27    


 


Magnesium  1.7 mg/dL (1.6-2.3)   05/07/17  07:23    














 05/07/17 07:23





 05/07/17 07:23





Assessment and Plan


Plan: 





Assessment: 


#1. Hyponatremia.  There is a component of hypovolemia as sodium level did 

improve with normal saline from 122-128 this morning.  There is also a 

component of SIADH which is from Depakote and Trileptal.


#2.  History of seizures.





Plan:


Continue with normal saline at 75 mL an hour.


I will put her on a 1.2 L fluid restriction.


Maintain demeclocycline.


Repeat sodium level at 6 PM today.


Patient may benefit from a neurology consultation to see if alternate can be 

found for Depakote and Trileptal to prevent recurrent hyponatremia.





Thank you for the consultation.  I will continue to follow the patient with you 

during his hospital stay.

## 2017-05-07 NOTE — P.CNNES
History of Present Illness


Consult date: 05/07/17


Reason for Consult: Patient admitted with recurrent hyponatremia possible due 

to Trileptal.  


History of Present Illness: 





This patient is a 57-year-old right-handed white female who was admitted to 

Hospital yesterday after she was found to have evidence of severe hyponatremia.

  Patient was just recently discharged from the hospital 2 weeks ago for an 

episode of hyponatremia.  She was felt to have possibility of SIADH syndrome 

and did receive some treatment at the time.  She was discharged home and blood 

work that was just done this past Friday revealed her serum sodium to be 

severely low at 116.  She was advised to come back to the hospital which she 

did yesterday.  Patient states she has a history of seizures diagnosed about a 

year ago for which she was placed on Depakote and Trileptal.  She has been 

taking Trileptal 300 mg twice a day and Depakote 500 mg twice a day.  She does 

have a history of bipolar disorder for which she is undergoing treatment as 

well.  She is on multiple medications for treatment of the bipolar disorder 

prescribed by her primary care physician.  The patient was treated for the 

hyponatremia and placed on 0.9 normal saline.  Her serum sodium today as, 

slightly 228.  We have reviewed the sodium levels today with the patient.  When 

questioned about her seizure history apparently she had a withdrawal seizure 

about a year ago due to the use of Xanax.  She essentially stopped using Xanax 

altogether.  At the time about a year ago she was started on 2 seizure 

medications which include the Depakote and Trileptal.  We have explained the 

side effect of Trileptal is the possibility of hyponatremia.  She is not sure 

she requires seizure medication as she has not had seizures over the past 1 

year.  Given her serum sodium and recurrent hyponatremia we are recommending 

she consider weaning off of Trileptal.  She should continue on Depakote at this 

time.  Patient is agreeable and we will cut her Trileptal dose to 150 mg twice 

a day 2 doses.  She will then get 1 dose of Trileptal 150 mg daily and then 

may discontinue Trileptal altogether.  We will obtain a routine EEG tomorrow 

morning for further evaluation.  She should follow-up with her primary care 

physician for further treatment of her bipolar disorder.  Patient states that 

she would like to come off of more medications if possible.  We recommend she 

discuss this further with her primary care physician.  At this time we will 

continue her on weaning parameters for Trileptal today.  We will continue to 

follow her serum sodium levels closely.  She should follow-up with her primary 

care physician soon after discharge.  Her overall prognosis at this time 

remains guarded.  Patient denies any previous history of closed head injury or 

head trauma in the past.  She has no previous history of seizures prior to the 

event that occurred about a year ago.  Neurology is now been consulted for 

further evaluation and recommendations.





Review of Systems


Constitutional: Denies chills, Denies fever


Eyes: denies blurred vision, denies pain


Ears, nose, mouth and throat: Denies headache, Denies sore throat


Cardiovascular: Denies chest pain, Denies shortness of breath


Respiratory: Denies cough


Gastrointestinal: Denies abdominal pain, Denies diarrhea, Denies nausea, Denies 

vomiting


Genitourinary: Denies dysuria, Denies hematuria


Musculoskeletal: Denies myalgias


Integumentary: Denies pruritus, Denies rash


Neurological: Denies numbness, Denies weakness


Psychiatric: Denies anxiety, Denies depression


Endocrine: Denies fatigue, Denies weight change





Past Medical History


Additional Past Medical History / Comment(s): anxiety, depression/bipolar, 

bowel obstruction hx of iatraogenic bowel injury during gynecological case/

adhesions, constipation,emphysema.


History of Any Multi-Drug Resistant Organisms: None Reported


Past Surgical History: Appendectomy, Bowel Resection, Cholecystectomy, 

Hysterectomy


Additional Past Surgical History / Comment(s): rotator cuff repair.


Past Anesthesia/Blood Transfusion Reactions: No Reported Reaction


Past Psychological History: Anxiety, Bipolar, Depression


Additional Psychological History / Comment(s): at time of admission pt denies 

having any problem with depression


Smoking Status: Current every day smoker


Past Alcohol Use History: None Reported


Additional Past Alcohol Use History / Comment(s): pt states she is trying to 

quit smoking and is currently smoking 2 ppd


Past Drug Use History: None Reported


Additional Drug Use History / Comment(s): smoked last 1-21-16





- Past Family History


  ** Father


Family Medical History: Hypertension





  ** Mother


Additional Family Medical History / Comment(s): Macular degeneration





Medications and Allergies


 Home Medications











 Medication  Instructions  Recorded  Confirmed  Type


 


ALPRAZolam [Xanax] 1 mg PO DAILY PRN 07/17/16 05/06/17 History


 


traZODone  mg PO HS 07/17/16 05/06/17 History


 


Divalproex Sodium [Depakote] 500 mg PO BID 04/30/17 05/06/17 History


 


Linaclotide [Linzess] 290 mcg PO QAM 04/30/17 05/06/17 History


 


traMADol HCl [Ultram] 50 mg PO DAILY PRN 04/30/17 05/06/17 History


 


Aspirin [Adult Low Dose Aspirin EC] 81 mg PO DAILY 05/02/17 05/06/17 History


 


L.acidoph,Paracasei, B.lactis 1 cap PO DAILY 05/06/17 05/06/17 History





[Probiotic]    


 


Multivitamins, Thera [Multivitamin 1 tab PO DAILY 05/06/17 05/06/17 History





(formulary)]    


 


Naproxen Sodium [Aleve] 220 mg PO Q12HR PRN 05/06/17 05/06/17 History


 


QUEtiapine FUMARATE [SEROquel] 300 mg PO HS 05/06/17 05/06/17 History











 Allergies











Allergy/AdvReac Type Severity Reaction Status Date / Time


 


codeine Allergy  Rash/Hives Verified 05/06/17 17:23














Physical Examination





- Vital Signs


Vital Signs: 


 Vital Signs











  Temp Pulse Resp BP Pulse Ox


 


 05/07/17 13:54  97.6 F  90  16  115/55  97


 


 05/07/17 07:29   83  16  


 


 05/07/17 07:00  97.6 F  83  16  119/62  97


 


 05/07/17 04:00   86  12  


 


 05/07/17 00:00   86  12  


 


 05/06/17 23:00  97.4 F L  86  12  128/71  95


 


 05/06/17 20:00   79  16  


 


 05/06/17 17:01  97.7 F  79  16  137/89  96








 Intake and Output











 05/06/17 05/07/17 05/07/17





 22:59 06:59 14:59


 


Intake Total 260 600 490


 


Output Total   300


 


Balance 260 600 190


 


Intake:   


 


  Intake, IV Titration 260 600 





  Amount   


 


    Sodium Chloride 0.9% 1, 260 600 





    000 ml @ 75 mls/hr IV .   





    [id number] [facility] Rx#:[id number]   


 


  Oral   490


 


Output:   


 


  Urine   300


 


Other:   


 


  # Voids   1


 


  Weight 58.5 kg 58.5 kg 58.5 kg








 Patient Weight











 05/08/17





 06:59


 


Weight 58.5 kg














- Constitutional


General appearance: average body habitus, cooperative





- EENT


EENT: PERRL, mucous membranes moist





- Respiratory


Respiratory: lungs clear, normal breath sounds





- Cardiovascular


Cardiovascular: regular rate, normal S1, normal S2


Extremities: no peripheral edema bilaterally





- Gastrointestinal


Gastrointestinal: normoactive bowel sounds





- Integumentary


Integumentary: normal





- Neurologic


Cranial nerve examination: PERRL, EOMI, VFF, V1/V2/V3 grossly intact, face 

symmetric, tongue midline, intact gag reflex, intact corneal reflex, normal 

palatal elevation


Speech examination: intact


Sensorimotor examination: intact


Detailed motor examination: grossly full strength in all extremities


Detailed sensory examination: intact


Reflex and gait examination: intact


Reflexes: 1+: ankle, bicep, knee, tricep





- Musculoskeletal


Musculoskeletal: no pain





- Psychiatric


Psychiatric: mood/affect appropriate, cooperative





Results





- Laboratory Findings


CBC and BMP: 


 05/07/17 07:23





 05/07/17 07:23


Abnormal Lab Findings: 


 Abnormal Labs











  05/06/17 05/07/17 05/07/17





  16:13 07:23 07:23


 


RBC    3.53 L


 


MCV    101.1 H


 


Sodium   128 L 


 


Carbon Dioxide   20 L 


 


Osmolality   


 


Ur Leukocyte Esterase  Small H  


 


Amorphous Sediment  Rare H  














  05/07/17





  07:23


 


RBC 


 


MCV 


 


Sodium 


 


Carbon Dioxide 


 


Osmolality  257 L


 


Ur Leukocyte Esterase 


 


Amorphous Sediment 














Assessment and Plan


(1) Hyponatremia


Status: Acute   Code(s): E87.1 - HYPO-OSMOLALITY AND HYPONATREMIA   





(2) Seizure disorder


Status: Acute   Code(s): G40.909 - EPILEPSY, UNSP, NOT INTRACTABLE, WITHOUT 

STATUS EPILEPTICUS   





(3) Bipolar 1 disorder


Status: Acute   Code(s): F31.9 - BIPOLAR DISORDER, UNSPECIFIED   





(4) Hypokalemia due to inadequate potassium intake


Status: Acute   Code(s): E87.6 - HYPOKALEMIA   


Plan: 





This patient is a 57-year-old female was admitted to hospital for severe 

hyponatremia.  Her admission serum sodium was 116.  She has been having 

recurrent admissions to the hospital for treatment of hyponatremia.  She has 

been taking Trileptal as one of her anticonvulsant medications as she has a 

history of seizures dating back a year ago.  She has been taking combination of 

Depakote and Trileptal.  We've explained that Trileptal side effect is severe 

hyponatremia.  We are recommending she be weaned off the Trileptal and to 

continue on Depakote for further management of underlying history of seizures.  

She is currently taking Trileptal 300 mg twice a day.  We will cut her 

Trileptal in half to 150 mg twice a day for 2 doses at which time she will then 

go to Trileptal 150 mg daily for 1 dose.  She will then DC the Trileptal 

altogether.  She is to be maintained on her current dose of Depakote.  We will 

obtain routine EEG for further assessment of history of seizures.  Her overall 

prognosis at this time remains guarded.  She should follow-up with her primary 

care physician for further treatment of her bipolar disorder.  Her overall 

prognosis at this time remains guarded.  Continue with treatment as per 

nephrology and further management of the hyponatremia.  Hopefully this 

correction of her anticonvulsant should keep her serum sodium levels in the 

normal range.  Her overall prognosis at this time remains guarded.


Time with Patient: Greater than 30

## 2017-05-08 LAB
ALP SERPL-CCNC: 80 U/L (ref 38–126)
ALT SERPL-CCNC: 23 U/L (ref 9–52)
ANION GAP SERPL CALC-SCNC: 8 MMOL/L
AST SERPL-CCNC: 24 U/L (ref 14–36)
BASOPHILS # BLD AUTO: 0 K/UL (ref 0–0.2)
BASOPHILS NFR BLD AUTO: 1 %
BUN SERPL-SCNC: 8 MG/DL (ref 7–17)
CALCIUM SPEC-MCNC: 9 MG/DL (ref 8.4–10.2)
CH: 33.8
CHCM: 34.2
CHLORIDE SERPL-SCNC: 101 MMOL/L (ref 98–107)
CO2 SERPL-SCNC: 22 MMOL/L (ref 22–30)
EOSINOPHIL # BLD AUTO: 0.1 K/UL (ref 0–0.7)
EOSINOPHIL NFR BLD AUTO: 1 %
ERYTHROCYTE [DISTWIDTH] IN BLOOD BY AUTOMATED COUNT: 3.54 M/UL (ref 3.8–5.4)
ERYTHROCYTE [DISTWIDTH] IN BLOOD: 12.7 % (ref 11.5–15.5)
GLUCOSE SERPL-MCNC: 76 MG/DL (ref 74–99)
HCT VFR BLD AUTO: 35.1 % (ref 34–46)
HDW: 2.23
HGB BLD-MCNC: 12.2 GM/DL (ref 11.4–16)
LUC NFR BLD AUTO: 3 %
LYMPHOCYTES # SPEC AUTO: 1.4 K/UL (ref 1–4.8)
LYMPHOCYTES NFR SPEC AUTO: 39 %
MAGNESIUM SPEC-SCNC: 1.8 MG/DL (ref 1.6–2.3)
MCH RBC QN AUTO: 34.4 PG (ref 25–35)
MCHC RBC AUTO-ENTMCNC: 34.7 G/DL (ref 31–37)
MCV RBC AUTO: 99.1 FL (ref 80–100)
MONOCYTES # BLD AUTO: 0.3 K/UL (ref 0–1)
MONOCYTES NFR BLD AUTO: 9 %
NEUTROPHILS # BLD AUTO: 1.8 K/UL (ref 1.3–7.7)
NEUTROPHILS NFR BLD AUTO: 47 %
NON-AFRICAN AMERICAN GFR(MDRD): >60
POTASSIUM SERPL-SCNC: 4.8 MMOL/L (ref 3.5–5.1)
PROT SERPL-MCNC: 6.3 G/DL (ref 6.3–8.2)
SODIUM SERPL-SCNC: 131 MMOL/L (ref 137–145)
WBC # BLD AUTO: 0.11 10*3/UL
WBC # BLD AUTO: 3.7 K/UL (ref 3.8–10.6)
WBC (PEROX): 3.66

## 2017-05-08 RX ADMIN — DIVALPROEX SODIUM SCH MG: 500 TABLET, DELAYED RELEASE ORAL at 19:53

## 2017-05-08 RX ADMIN — DEMECLOCYCLINE SCH MG: 150 TABLET ORAL at 07:57

## 2017-05-08 RX ADMIN — CEFAZOLIN SCH MLS/HR: 330 INJECTION, POWDER, FOR SOLUTION INTRAMUSCULAR; INTRAVENOUS at 07:58

## 2017-05-08 RX ADMIN — DIVALPROEX SODIUM SCH MG: 500 TABLET, DELAYED RELEASE ORAL at 07:57

## 2017-05-08 RX ADMIN — ASPIRIN 81 MG CHEWABLE TABLET SCH MG: 81 TABLET CHEWABLE at 07:57

## 2017-05-08 RX ADMIN — LACTOBACILLUS ACIDOPHILUS / LACTOBACILLUS BULGARICUS SCH EACH: 100 MILLION CFU STRENGTH GRANULES at 07:57

## 2017-05-08 RX ADMIN — ASPIRIN SCH MCG: 325 TABLET ORAL at 07:57

## 2017-05-08 RX ADMIN — THERA TABS SCH EACH: TAB at 12:46

## 2017-05-08 RX ADMIN — DEMECLOCYCLINE SCH MG: 150 TABLET ORAL at 19:53

## 2017-05-08 NOTE — P.PN
Subjective





This patient is a 57-year-old female who is been admitted with symptoms of 

recurrent hyponatremia.  Patient has a history of underlying seizure disorder 

for which she has been taking combination of Trileptal and Depakote.  She was 

just admitted 2 weeks ago with similar episode of severe hyponatremia.  She was 

readmitted due to hyponatremia on the day of admission when her serum sodium 

was 122.  We had recommended yesterday that she be slowly weaned off of 

Trileptal.  She is currently on Trileptal 150 mg for 2 doses today and tomorrow 

will only take one pill and discontinue Trileptal altogether.  Her serum sodium 

this morning is 131.  She has not had any further difficulties as we have been 

weaning the Trileptal.  We will continue close neurological follow-up for the 

patient.  Recommend to repeat her sodium level tomorrow morning.  She may 

continue on Depakote monotherapy at this time.  Her overall prognosis remains 

guarded.





Objective





- Vital Signs


Vital signs: 


 Vital Signs











Temp  98.3 F   05/08/17 19:44


 


Pulse  78   05/08/17 19:44


 


Resp  18   05/08/17 19:44


 


BP  140/68   05/08/17 19:44


 


Pulse Ox  98   05/08/17 19:44








 Intake & Output











 05/08/17 05/08/17 05/09/17





 06:59 18:59 06:59


 


Intake Total 562.5 800 200


 


Output Total  500 575


 


Balance 562.5 300 -375


 


Intake:   


 


  Intake, IV Titration 562.5  





  Amount   


 


    Sodium Chloride 0.9% 1, 562.5  





    000 ml @ 75 mls/hr IV .   





    [id number] [facility] Rx#:[id number]   


 


  Oral  800 200


 


Output:   


 


  Urine  500 575


 


Other:   


 


  Voiding Method Toilet  Toilet


 


  # Voids 2 2 500


 


  # Bowel Movements 1  














- Exam





Physical examination:





PHYSICAL EXAMINATION: Patient is resting comfortably in bed. 


VITAL SIGNS: Blood pressure is [140/68]. Heart rate is [78]. Respiration is [18]

. Temperature is [98.3].


HEENT: Head is atraumatic, neck is supple, there were no carotid bruits.


CHEST: Lungs are clear to auscultation and percussion.  


CARDIAC: S1, S2 normal rate and rhythm. There is no murmur.


ABDOMEN: Soft and nontender. Bowel sounds are present.


EXTREMITIES:  There is no pedal edema.  Peripheral pulses are present.





Neurological examination:





Patient has a nonfocal neurological examination today.





- Labs


CBC & Chem 7: 


 05/08/17 06:29





 05/08/17 06:29


Labs: 


 Abnormal Lab Results - Last 24 Hours (Table)











  05/08/17 05/08/17 Range/Units





  06:29 06:29 


 


WBC   3.7 L  (3.8-10.6)  k/uL


 


RBC   3.54 L  (3.80-5.40)  m/uL


 


Sodium  131 L   (137-145)  mmol/L














Assessment and Plan


(1) Hyponatremia


Status: Acute   Code(s): E87.1 - HYPO-OSMOLALITY AND HYPONATREMIA   





(2) Seizure disorder


Status: Acute   Code(s): G40.909 - EPILEPSY, UNSP, NOT INTRACTABLE, WITHOUT 

STATUS EPILEPTICUS   





(3) Bipolar 1 disorder


Status: Acute   Code(s): F31.9 - BIPOLAR DISORDER, UNSPECIFIED   





(4) Hypokalemia due to inadequate potassium intake


Status: Acute   Code(s): E87.6 - HYPOKALEMIA   


Plan: 





This patient is a 57-year-old female admitted with recurrent hyponatremia.  She 

was just admitted 2 weeks ago with similar episode of severe hyponatremia.  She 

has a history of underlying seizure disorder for which she has been taking 

combination of Trileptal and Depakote.  We reviewed that the Trileptal most 

likely is the culprit for her hyponatremia.  We have started weaning the 

patient off of Trileptal altogether.  She may continue on Depakote monotherapy.

  Her serum sodium level today is 131.  We will continue to wean her off 

Trileptal over the next 24 hours.  She may continue on Depakote monotherapy at 

that time.  Overall she seems to be making progress.  We will recheck her serum 

sodium level tomorrow morning and give further recommendations.  Overall 

prognosis at this time remains guarded.

## 2017-05-08 NOTE — P.PN
Subjective





57-year-old admitted with recurrent hyponatremia likely due to Trileptal .  

Patient was just discharged 2 weeks prior for similar episode.  Was felt the 

patient may have a possible SIADH syndrome.  Patient states she has a history 

of a seizure disorder diagnosed a year ago and was placed on Depakote and 

Trileptal.  Patient states she's been taken Trileptal 300 mg twice a day and 

Depakote 500 mg twice a day.  Patient has been followed this admission by 

nephrology and neurology service the admission sodium 128.  Sodium this morning 

131





Objective





- Vital Signs


Vital signs: 


 Vital Signs











Temp  97.8 F   05/08/17 15:00


 


Pulse  97   05/08/17 15:00


 


Resp  16   05/08/17 15:00


 


BP  159/86   05/08/17 15:00


 


Pulse Ox  95   05/08/17 15:00








 Intake & Output











 05/07/17 05/08/17 05/08/17





 18:59 06:59 18:59


 


Intake Total 490 562.5 800


 


Output Total 2700  500


 


Balance -2210 562.5 300


 


Weight 58.5 kg  


 


Intake:   


 


  Intake, IV Titration  562.5 





  Amount   


 


    Sodium Chloride 0.9% 1,  562.5 





    000 ml @ 75 mls/hr IV .   





    [id number] [facility] Rx#:[id number]   


 


  Oral 490  800


 


Output:   


 


  Urine 2700  500


 


Other:   


 


  Voiding Method  Toilet 


 


  # Voids 1 2 2


 


  # Bowel Movements  1 














- Exam





Physical exam 57-year-old female resting comfortably in bed appears in no acute 

distress


Lungs essentially clear adequate air movement


Heart S1-S2 audible and regular


Abdomen soft nontender


Extremities no edema





- Labs


CBC & Chem 7: 


 05/08/17 06:29





 05/08/17 06:29


Labs: 


 Abnormal Lab Results - Last 24 Hours (Table)











  05/07/17 05/08/17 05/08/17 Range/Units





  16:22 06:29 06:29 


 


WBC    3.7 L  (3.8-10.6)  k/uL


 


RBC    3.54 L  (3.80-5.40)  m/uL


 


Sodium  128 L  131 L   (137-145)  mmol/L














Assessment and Plan


Plan: 


Impression


Present on admission hyponatremia sodium 128


Component of SIADH suspect due to medications Depakote and Trileptal


Present on admission electrolyte abnormality hypokalemia due to inadequate 

potassium intake


Bipolar disorder


Seizure disorder








Plan


Continue recommendations by nephrology


Continue recommendations by neurology further recommendations pending will 

follow


DVT and GI prophylaxis


Repeat labs in the morning











The above dictated assessment and findings were discussed with dr nelson.  

Impression and the plan of care have been dictated as directed.  Lydia Alejandro 

nurse practitioner acting as a scribe for dr nelson

## 2017-05-08 NOTE — PN
Patient is seen for followup for hyponatremia. She was readmitted with 

serum sodium of about 122. Patient had tolvaptan on her last visit and 

her serum sodium was close to 133 mmHg at the time of discharge. This 

time she was maintained on normal saline. Her sodium has come up to 

134 and patient states that she wants to go home. I have advised her 

that we need to monitor her serum sodium level without any IV saline 

or the medications to ensure that she is able to maintain her sodium 

level as outpatient. Patient is adamant that she wants to go home 

today. I have advised her that we will need to repeat labs tomorrow as 

well as day after tomorrow as outpatient if she is discharged.  



On examination, blood pressure is 109/72, heart rate 88 per minute. 

She is afebrile.  

EXAMINATION OF THE HEART:  S1 and S2. 

EXAMINATION OF THE LUNGS: Decreased breath sounds in the bases. 

ABDOMEN: Soft, nontender. 

Examination of the lower extremities shows no evidence of edema. 

CNS exam is grossly intact. 



Labs show sodium 131, potassium 4.8, serum creatinine 0.6 mg/dL. 



ASSESSMENT:  Hyponatremia, currently hypovolemic and improving with 

normal saline. I will stop the normal saline and check her sodium 

level off of the IV normal saline. The patient is also being 

considered for discontinuation of Trileptal and Depakote, which was 

started secondary to her being addicted to benzodiazepines.  



PLAN: DC normal saline. Repeat labs in the a.m. as well as day after 

tomorrow if patient is discharged. She should continue to maintain 

fluid restriction as outpatient and she should continue with the 

demeclocycline as well.

## 2017-05-09 VITALS — HEART RATE: 71 BPM | DIASTOLIC BLOOD PRESSURE: 65 MMHG | TEMPERATURE: 97.8 F | SYSTOLIC BLOOD PRESSURE: 108 MMHG

## 2017-05-09 VITALS — RESPIRATION RATE: 16 BRPM

## 2017-05-09 LAB
ALP SERPL-CCNC: 101 U/L (ref 38–126)
ALT SERPL-CCNC: 22 U/L (ref 9–52)
ANION GAP SERPL CALC-SCNC: 10 MMOL/L
AST SERPL-CCNC: 34 U/L (ref 14–36)
BUN SERPL-SCNC: 14 MG/DL (ref 7–17)
CALCIUM SPEC-MCNC: 10 MG/DL (ref 8.4–10.2)
CHLORIDE SERPL-SCNC: 101 MMOL/L (ref 98–107)
CO2 SERPL-SCNC: 21 MMOL/L (ref 22–30)
GLUCOSE SERPL-MCNC: 78 MG/DL (ref 74–99)
NON-AFRICAN AMERICAN GFR(MDRD): >60
POTASSIUM SERPL-SCNC: 5.2 MMOL/L (ref 3.5–5.1)
PROT SERPL-MCNC: 7.7 G/DL (ref 6.3–8.2)
SODIUM SERPL-SCNC: 132 MMOL/L (ref 137–145)

## 2017-05-09 RX ADMIN — DIVALPROEX SODIUM SCH MG: 500 TABLET, DELAYED RELEASE ORAL at 08:44

## 2017-05-09 RX ADMIN — LACTOBACILLUS ACIDOPHILUS / LACTOBACILLUS BULGARICUS SCH EACH: 100 MILLION CFU STRENGTH GRANULES at 08:44

## 2017-05-09 RX ADMIN — THERA TABS SCH: TAB at 12:05

## 2017-05-09 RX ADMIN — ASPIRIN 81 MG CHEWABLE TABLET SCH MG: 81 TABLET CHEWABLE at 08:44

## 2017-05-09 RX ADMIN — DEMECLOCYCLINE SCH MG: 150 TABLET ORAL at 08:44

## 2017-05-09 RX ADMIN — ASPIRIN SCH MCG: 325 TABLET ORAL at 08:43

## 2017-05-09 NOTE — P.DS
Providers


Date of admission: 


05/06/17 15:35





Expected date of discharge: 05/09/17


Attending physician: 


Oral Nelson





Consults: 





 





05/07/17 09:32


Consult Physician Urgent 


   Consulting Provider: Jovani Foss


   Consult Reason/Comments: change seizure meds due to hyponatremia or stop them


   Do you want consulting provider notified?: Yes











Primary care physician: 


[facility] Course: 





57-year-old female who was recently discharged on May 2 at that time was 

treated for hyponatremia.  Patient returns on the sixth of May for abnormal 

blood work.  The sodium was noted to be 116.  Patient states that she was 

drinking a lot of water but had been trying to cut back.  Patient also is on 

Trileptal and Depakote for history of seizures.  Patient also states that she 

thought her seizures were associated with benzodiazepine use and withdrawal 

previously used.  Patient states patient this admission was followed by 

nephrology and neurology.  Dr. Foss did see patient nephrology was weaning 

the patient off of the Trileptal.  And on the day of discharge the Trileptal 

had been weaned off and patient was to be on monotherapy Depakote at this time.

  Additionally patient was to avoid any benzodiazepine.  Additionally 

nephrology dr baeza recommended the patient adhered to a strict fluid 

restriction no more than 1200 and a 24-hour period and to continue taking 

demeclocycline 150 twice a day follow patient in the office in 3 weeks.  At the 

time of discharge the sodium was monitored closely and it was up to 132.  The 

fluid restriction was reinforced to the patient.  Subsequently patient was felt 

to be hemodynamically stable and appropriate to proceed with a discharge





Impression discharge diagnosis





Present on admission hyponatremia sodium 128 hypovolemic


Component of SIADH suspect due to medications Depakote and Trileptal


Present on admission electrolyte abnormality hypokalemia due to inadequate 

potassium intake


Bipolar disorder


Seizure disorder


History of withdrawals from benzodiazepines


Current every day smoker 1 pack a day greater than 20 year history














The above dictated assessment and findings were discussed with dr leslie Collins and the plan of care have been dictated as directed.  Lydia Alejandro 

nurse practitioner acting as a scribe for dr nelson





Plan - Discharge Summary


New Discharge Prescriptions: 


Demeclocycline [Declomycin] 150 mg PO BID #60 tab


Discharge Medication List





traZODone  mg PO HS 07/17/16 [History]


Divalproex Sodium [Depakote] 500 mg PO BID 04/30/17 [History]


Linaclotide [Linzess] 290 mcg PO QAM 04/30/17 [History]


traMADol HCl [Ultram] 50 mg PO DAILY PRN 04/30/17 [History]


Aspirin [Adult Low Dose Aspirin EC] 81 mg PO DAILY 05/02/17 [History]


L.acidoph,Paracasei, B.lactis [Probiotic] 1 cap PO DAILY 05/06/17 [History]


Multivitamins, Thera [Multivitamin (formulary)] 1 tab PO DAILY 05/06/17 [History

]


Naproxen Sodium [Aleve] 220 mg PO Q12HR PRN 05/06/17 [History]


QUEtiapine FUMARATE [SEROquel] 300 mg PO HS 05/06/17 [History]


Demeclocycline [Declomycin] 150 mg PO BID #60 tab 05/09/17 [Rx]








Follow up Appointment(s)/Referral(s): 


Brian Pruitt MD [Primary Care Provider] - 1-2 days


Jovani Foss MD [STAFF PHYSICIAN] - 1 Week


Mirtha Baeza MD [STAFF PHYSICIAN] - 1 Week


Ambulatory/Diagnostic Orders: 


Comprehensive Metabolic Panel [LAB.AMB] Time Frame: 05/15/17, Location: 

Determined By Patient


Activity/Diet/Wound Care/Special Instructions: 


A strict fluid restriction no more than 1200ml in 24 hours


Discharge Disposition: HOME SELF-CARE

## 2017-05-10 NOTE — EEG
DATE OF SERVICE:  05/08/2017



INDICATIONS FOR EXAMINATION:   This patient is a 57-year-old female 

being evaluated for recurrent episodes of hyponatremia. Patient with 

remote history of seizure disorder. Currently taking Trileptal and 

Depakote. Hyponatremia felt to be secondary to Trileptal use.  



AGE:   57Y





EEG FINDINGS: A routine 21-channel, awake digital EEG recording was 

accomplished utilizing the 10 - 20 international system with bipolar 

and referential montages. The background activity in the most alert 

resting state consists of a low to medium amplitude, poorly-developed 

and poorly-sustained 6 Hz activity over the posterior head regions. 

This posterior rhythm attenuates to eye opening. There is a small 

amount of low amplitude 18 - 20 Hz beta activity seen maximally over 

the anterior head regions. Muscle and movement artifact was observed 

on a few occasions during the tracing.  



Hyperventilation was not performed. Photic stimulation at flash 

frequencies of 2 - 30 Hz produced a minimal occipital driving 

response. No epileptiform discharges were seen.  



IMPRESSION: This EEG is mildly abnormal in a diffuse fashion due to 

slowing of the EEG background. The EEG failed to reveal any focal, 

lateralized or epileptiform abnormalities. Clinical correlation is 

recommended.

## 2017-05-10 NOTE — PN
Patient is seen for followup for hyponatremia. Currently patient is 

going home.  Her sodium has improved with normal saline. She is being 

discharged on demeclocycline along with fluid restriction, with repeat 

labs to be done in 2 to 3 days' time as outpatient.  



On examination today, blood pressure is 108/65, heart rate 71 per 

minute. She is afebrile.  

Examination of the heart, S1 and S2. Examination of the lungs, 

decreased breath sounds in the bases. Abdomen is soft, nontender. 

Examination of lower extremities shows no evidence of edema.  



Labs show potassium 5.2, sodium 132, serum creatinine 0.76. 



ASSESSMENT:   Hyponatremia, initially hypovolemic and improved with 

normal saline, but patient has had euvolemic hyponatremia on her last 

admission.  She is currently maintained on demeclocycline, which we 

will continue along with fluid restriction. She is advised to increase 

protein in her diet and we will repeat labs as outpatient in 2 to 3 

days' time.

## 2017-06-09 ENCOUNTER — HOSPITAL ENCOUNTER (OUTPATIENT)
Dept: HOSPITAL 47 - OR | Age: 57
LOS: 1 days | Discharge: HOME HEALTH | End: 2017-06-10
Attending: ORTHOPAEDIC SURGERY
Payer: MEDICARE

## 2017-06-09 VITALS — HEART RATE: 82 BPM

## 2017-06-09 VITALS — BODY MASS INDEX: 23.6 KG/M2

## 2017-06-09 DIAGNOSIS — S93.601A: ICD-10-CM

## 2017-06-09 DIAGNOSIS — R53.83: ICD-10-CM

## 2017-06-09 DIAGNOSIS — F31.70: ICD-10-CM

## 2017-06-09 DIAGNOSIS — J43.9: ICD-10-CM

## 2017-06-09 DIAGNOSIS — G40.909: ICD-10-CM

## 2017-06-09 DIAGNOSIS — S93.401A: ICD-10-CM

## 2017-06-09 DIAGNOSIS — S93.602A: ICD-10-CM
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
## 2017-06-09 DIAGNOSIS — Z79.2: ICD-10-CM

## 2017-06-09 DIAGNOSIS — F17.200: ICD-10-CM

## 2017-06-09 DIAGNOSIS — S82.841A: Primary | ICD-10-CM

## 2017-06-09 DIAGNOSIS — S93.432A: ICD-10-CM

## 2017-06-09 DIAGNOSIS — Z79.899: ICD-10-CM

## 2017-06-09 DIAGNOSIS — Z88.5: ICD-10-CM

## 2017-06-09 DIAGNOSIS — Z79.82: ICD-10-CM

## 2017-06-09 LAB
ANION GAP SERPL CALC-SCNC: 10 MMOL/L
CHLORIDE SERPL-SCNC: 104 MMOL/L (ref 98–107)
CO2 SERPL-SCNC: 23 MMOL/L (ref 22–30)
POTASSIUM SERPL-SCNC: 5.2 MMOL/L (ref 3.5–5.1)
SODIUM SERPL-SCNC: 137 MMOL/L (ref 137–145)

## 2017-06-09 PROCEDURE — 73600 X-RAY EXAM OF ANKLE: CPT

## 2017-06-09 PROCEDURE — 80048 BASIC METABOLIC PNL TOTAL CA: CPT

## 2017-06-09 PROCEDURE — 27814 TREATMENT OF ANKLE FRACTURE: CPT

## 2017-06-09 PROCEDURE — 80051 ELECTROLYTE PANEL: CPT

## 2017-06-09 PROCEDURE — 85025 COMPLETE CBC W/AUTO DIFF WBC: CPT

## 2017-06-09 RX ADMIN — HYDROMORPHONE HYDROCHLORIDE PRN MG: 1 INJECTION, SOLUTION INTRAMUSCULAR; INTRAVENOUS; SUBCUTANEOUS at 17:28

## 2017-06-09 RX ADMIN — HYDROMORPHONE HYDROCHLORIDE PRN MG: 1 INJECTION, SOLUTION INTRAMUSCULAR; INTRAVENOUS; SUBCUTANEOUS at 12:10

## 2017-06-09 RX ADMIN — HYDROMORPHONE HYDROCHLORIDE PRN MG: 1 INJECTION, SOLUTION INTRAMUSCULAR; INTRAVENOUS; SUBCUTANEOUS at 20:21

## 2017-06-09 RX ADMIN — POTASSIUM CHLORIDE SCH MLS/HR: 14.9 INJECTION, SOLUTION INTRAVENOUS at 16:44

## 2017-06-09 RX ADMIN — HYDROMORPHONE HYDROCHLORIDE PRN MG: 1 INJECTION, SOLUTION INTRAMUSCULAR; INTRAVENOUS; SUBCUTANEOUS at 11:55

## 2017-06-09 RX ADMIN — HYDROMORPHONE HYDROCHLORIDE PRN MG: 1 INJECTION, SOLUTION INTRAMUSCULAR; INTRAVENOUS; SUBCUTANEOUS at 14:21

## 2017-06-09 RX ADMIN — POTASSIUM CHLORIDE SCH: 14.9 INJECTION, SOLUTION INTRAVENOUS at 17:35

## 2017-06-09 RX ADMIN — DIVALPROEX SODIUM SCH MG: 500 TABLET, DELAYED RELEASE ORAL at 20:24

## 2017-06-09 RX ADMIN — POTASSIUM CHLORIDE SCH MLS/HR: 14.9 INJECTION, SOLUTION INTRAVENOUS at 21:52

## 2017-06-09 RX ADMIN — POTASSIUM CHLORIDE SCH MLS: 14.9 INJECTION, SOLUTION INTRAVENOUS at 08:57

## 2017-06-09 RX ADMIN — HYDROMORPHONE HYDROCHLORIDE PRN MG: 1 INJECTION, SOLUTION INTRAMUSCULAR; INTRAVENOUS; SUBCUTANEOUS at 11:50

## 2017-06-09 RX ADMIN — NICOTINE SCH: 21 PATCH, EXTENDED RELEASE TRANSDERMAL at 17:14

## 2017-06-09 NOTE — FL
FLUOROSCOPY

 

2 seconds of fluoroscopy time were utilized during internal fixation of the right ankle. 2 images doc
ument the procedure.

## 2017-06-09 NOTE — XR
EXAMINATION TYPE: XR ankle limited RT

 

DATE OF EXAM ORDERED: 6/9/2017

 

HISTORY: s/p ORIF right ankle, assess alignment.

 

COMPARISON: None.

 

FINDINGS:  There has been sideplate and screw fixation of the distal fibula. The medullary screw dewey
sfixes a fracture of the medial malleolus. There are metallic skin sutures present both medially and 
laterally. The ankle is immobilized tendon dorsal SLAP.

 

IMPRESSION: 

 

STATUS POST INTERNAL FIXATION OF BIMALLEOLAR FRACTURE OF THE RIGHT ANKLE.

## 2017-06-09 NOTE — CONS
DATE OF CONSULTATION:  06/09/2017. 



REASON FOR CONSULTATION: Medical management requested by Dr. Carranza. 



CONSULTATION: This is a 57-year-old patient who has undergone a right 

ankle surgery, postoperatively, rather under effects of anesthesia, 

still rather drowsy along with some questions though.  Chronic stable 

medical conditions include seizures, bipolar disorder, emphysema, 

lying in bed, lethargic but arousable to answer questions. Denies any 

chest pain, shortness of breath.  



REVIEW OF SYSTEMS:

CONSTITUTIONAL: None. 

HEENT: None. 

RESPIRATORY: Occasional wheezing. 

CARDIOVASCULAR: None. 

GASTROINTESTINAL: None. 

GENITOURINARY: None.

MUSCULOSKELETAL: Some pain in the right ankle. 

Dermatologic:  None. 

LYMPHATIC: None. 

PSYCHIATRY: Bipolar disorder controlled. 

NEUROLOGICAL: None. 



Past history of seizure, bipolar disorder, emphysema. 



PAST SURGICAL HISTORY: Appendectomy, bowel resection, cholecystectomy, 

bilateral rotator cuff surgery. 



PSYCH HISTORY: Bipolar disorder. 



SOCIAL HISTORY: Smoking up to 2 packs a day. No alcohol. 



FAMILY HISTORY: Hypertension, macular degeneration. 



HOME MEDICATIONS:

1. Lasix 20 mg Monday, Wednesday and Friday.

2. Cymbalta 30 mg a day. 

3. Aspirin 81 mg a day. 

4. Vistaril 400 mg q.8 p.r.n. 

5. Norco 5, 1 tablet q.4 p.r.n. 

6. Naproxen 220 mg q.12 p.r.n. 

7. Multivitamin 1 tablet p.o. daily. 

8. Linzess 290 mcg p.o. daily. 

9. Probiotic 1 capsule p.o. daily. 

10. Depakote 500 mg p.o. b.i.d. 

11. Trazodone 200 mg at bedtime. 

12. Ultram 50 mg p.o. daily p.r.n.  

13. Seroquel 400 mg q.h.s. 

14. Senokot-S 2 tablets p.o. daily. 

15. Norco 5, 1 to 2 tablets q.4 p.r.n. 

16. Keflex 500 mg q.8h.

17. Aspirin 325 p.o. b.i.d. 



ALLERGIES: CODEINE. 



On examination, temperature 97.7, pulse 105, respiratory  rate 16, 

blood pressure 103/54, pulse ox 95% on room air. 

GENERAL APPEARANCE:  Average build, lying in bed, comfortable. 

EYES: Pupils equal. Conjunctivae normal. 

HEENT: External appearance of nose and ears normal. Oral cavity normal. 

NECK: JVD not raised. Mass not palpable. 

RESPIRATORY: Effort normal. 

LUNGS: Decreased breath sounds. 

CARDIOVASCULAR: First and second sounds normal. No edema. 

ABDOMEN: Soft, nontender. Liver and spleen not palpable. 

LYMPHATIC: No lymph node palpable in neck or axillae. 

PSYCHIATRY: Alert and oriented x3. Mood and affect normal. 

NEUROLOGICAL: Pupils equal. Cranial nerves pupils equal. Cranial 

nerves grossly intact. Power and sensation grossly intact.  

EXTREMITIES: Right ankle in a dressing. 



INVESTIGATIONS: Potassium 5.2. 



ASSESSMENT:

1. Right ankle surgery. 

2. Chronic seizure disorder. 

3. Bipolar disorder in remission. 

4. Emphysema in a smoker. 

5. Chronic nicotine dependence.  The patient is a smoker. 



PLAN: Patient's home medications are resumed. For DVT prophylaxis, 

patient is on aspirin per orthopedics. Patient advised against 

smoking, given a nicotine patch. The patient should follow up with the 

family doctor when discharged.

## 2017-06-10 VITALS — TEMPERATURE: 96.9 F | RESPIRATION RATE: 18 BRPM | SYSTOLIC BLOOD PRESSURE: 106 MMHG | DIASTOLIC BLOOD PRESSURE: 59 MMHG

## 2017-06-10 LAB
ANION GAP SERPL CALC-SCNC: 8 MMOL/L
BASOPHILS # BLD AUTO: 0 K/UL (ref 0–0.2)
BASOPHILS NFR BLD AUTO: 0 %
BUN SERPL-SCNC: 9 MG/DL (ref 7–17)
CALCIUM SPEC-MCNC: 9.1 MG/DL (ref 8.4–10.2)
CH: 33.7
CHCM: 33.4
CHLORIDE SERPL-SCNC: 105 MMOL/L (ref 98–107)
CO2 SERPL-SCNC: 24 MMOL/L (ref 22–30)
EOSINOPHIL # BLD AUTO: 0.1 K/UL (ref 0–0.7)
EOSINOPHIL NFR BLD AUTO: 1 %
ERYTHROCYTE [DISTWIDTH] IN BLOOD BY AUTOMATED COUNT: 3.25 M/UL (ref 3.8–5.4)
ERYTHROCYTE [DISTWIDTH] IN BLOOD: 13.7 % (ref 11.5–15.5)
GLUCOSE SERPL-MCNC: 93 MG/DL (ref 74–99)
HCT VFR BLD AUTO: 32.9 % (ref 34–46)
HDW: 2.35
HGB BLD-MCNC: 11 GM/DL (ref 11.4–16)
LUC NFR BLD AUTO: 2 %
LYMPHOCYTES # SPEC AUTO: 2 K/UL (ref 1–4.8)
LYMPHOCYTES NFR SPEC AUTO: 32 %
MCH RBC QN AUTO: 34 PG (ref 25–35)
MCHC RBC AUTO-ENTMCNC: 33.6 G/DL (ref 31–37)
MCV RBC AUTO: 101.3 FL (ref 80–100)
MONOCYTES # BLD AUTO: 0.4 K/UL (ref 0–1)
MONOCYTES NFR BLD AUTO: 6 %
NEUTROPHILS # BLD AUTO: 3.7 K/UL (ref 1.3–7.7)
NEUTROPHILS NFR BLD AUTO: 58 %
NON-AFRICAN AMERICAN GFR(MDRD): >60
POTASSIUM SERPL-SCNC: 4.3 MMOL/L (ref 3.5–5.1)
SODIUM SERPL-SCNC: 137 MMOL/L (ref 137–145)
WBC # BLD AUTO: 0.15 10*3/UL
WBC # BLD AUTO: 6.3 K/UL (ref 3.8–10.6)
WBC (PEROX): 6.86

## 2017-06-10 RX ADMIN — HYDROCODONE BITARTRATE AND ACETAMINOPHEN PRN EACH: 5; 325 TABLET ORAL at 10:54

## 2017-06-10 RX ADMIN — NICOTINE SCH: 21 PATCH, EXTENDED RELEASE TRANSDERMAL at 10:55

## 2017-06-10 RX ADMIN — DIVALPROEX SODIUM SCH MG: 500 TABLET, DELAYED RELEASE ORAL at 10:55

## 2017-06-10 RX ADMIN — POTASSIUM CHLORIDE SCH MLS/HR: 14.9 INJECTION, SOLUTION INTRAVENOUS at 03:27

## 2017-06-10 RX ADMIN — HYDROMORPHONE HYDROCHLORIDE PRN MG: 1 INJECTION, SOLUTION INTRAMUSCULAR; INTRAVENOUS; SUBCUTANEOUS at 03:22

## 2017-06-10 RX ADMIN — HYDROCODONE BITARTRATE AND ACETAMINOPHEN PRN EACH: 5; 325 TABLET ORAL at 06:15

## 2017-06-10 NOTE — OP
DATE OF SERVICE:  06/09/2017



SURGEON:  KAPIL BENEDICT DO

ASSISTANT:  Krupa Munoz NP

PREOPERATIVE DIAGNOSIS:  Displaced bimalleolar fracture of the right ankle. 

POSTOPERATIVE DIAGNOSIS:  Displaced bimalleolar fracture of the right ankle. 

OPERATION:       Open reduction and internal fixation displaced 

bimalleolar fracture of the right ankle.   

ANESTHESIA:  

ESTIMATED BLOOD LOSS:  

SPECIMENS REMOVED:  

COMPLICATIONS:  



OPERATIVE FINDINGS:  



DESCRIPTION OF PROCEDURE:  The patient was taken to the operating room 

suite and placed in supine position.  General inhalation anesthesia 

was performed by the Department of Anesthesiology.  Betadine prep was 

carried out over the right ankle, leg. Sterile drapes applied in the 

usual manner. The pneumatic tourniquet was inflated to 350 mmHg.    



A lateral incision was developed over the distal fibula.  Dissected through the 

subcutaneous tissue was performed.   Fracture was identified.  Soft 

tissue removed from the fracture with periosteal elevator.   

A Semi tubular locking plate was placed and was molded and positioned

over the fracture.  The plate was secured with six screws.   X-rays of the 

ankle showed anatomic alignment.    



The procedure being carried out of the malleolar fracture. The soft 

tissue was elevated from the fracture.   Fracture was reduced and a 

4.0 malleolar screw was inserted and secured  fracture fragment medially.  

X-rays were then obtained.  Anatomical alignment noted.  Both wounds 

were irrigated.  



The medial incision was approximated with #1 Vicryl suture.  Subcutaneous 

suture with 2-0.  Skin approximated with skin clips.   



Lateral wound was irrigated and the deep fascia approximated with Vicryl 
suture.  2-0 Vicryl was  

used subcutaneous tissue, followed by  approximated with skin 

clips.  Betadine, Adaptic, sterile pressure dressing was applied.    

The final x-rays documented the anatomic position.  Posterior splint 

applied and pneumatic tourniquet was deflated.  The patient was 

transferred to the recovery room in satisfactory postop condition.  



GROSS PATHOLOGY:  Displaced bimalleolar fracture of the right ankle.  



CHARU

## 2017-06-10 NOTE — P.DS
Providers


Date of admission: 


6/9/2017





Expected date of discharge: 06/10/17


Attending physician: 


Abdoulaye Carranza





Consults: 





 





06/09/17 11:27


Consult Physician Routine 


   Consulting Provider: Jacques Tirado


   Consult Reason/Comments: medical management


   Do you want consulting provider notified?: Yes











Primary care physician: 


Stated None








- Discharge Diagnosis(es)


(1) Ankle fracture, bimalleolar, closed


Patient is a pleasant 57-year-old female submitted the OR on June 9, 2017 to 

undergo ORIF of right bimalleolar ankle fracture.  She desired proceed with 

surgical intervention after given informed consent including possible risks, 

complications, and benefits.  She underwent the above procedure which she 

tolerated well without complication.  Her postoperative hospital course has 

remained without complication.  On day of discharge she is afebrile, vital 

signs stable, labs within acceptable ranges, wound benign, splint and bandage 

intact without evidence of active bleeding or dehiscence, neurovascular status 

intact with motor and sensation distally, less than 2 second cap refill is 

present distally, abdomen is soft nontender, denying new complaints, tolerating 

by mouth meds and diet, voiding without difficulty, passing flatus.  Review of 

systems is negative for fever, chills, chest pain, shortness breath, nausea, 

vomiting, dizziness, numbness, tingling, calf pain, abdominal pain, headaches, 

slurred speech or other.


Current Visit: Yes   Status: Acute   Priority: Medium   


Procedures: 





ORIF right bimalleolar ankle fracture


Patient Condition at Discharge: Good





Plan - Discharge Summary


New Discharge Prescriptions: 


New


   Cephalexin [Keflex] 500 mg PO Q8HR #15 cap


   Aspirin 325 mg PO BID #60 tab


   HYDROcodone/APAP 5-325MG [Norco 5] 1 - 2 each PO Q4-6H PRN #40 tab


     PRN Reason: Pain


   Sennosides-Docusate Sodium [Senokot-S] 2 tab PO DAILY #30 tablet





No Action


   traZODone  mg PO HS


   traMADol HCl [Ultram] 50 mg PO DAILY PRN


     PRN Reason: Pain


   Linaclotide [Linzess] 290 mcg PO QAM


   Divalproex Sodium [Depakote] 500 mg PO BID


   Aspirin [Adult Low Dose Aspirin EC] 81 mg PO DAILY


   QUEtiapine FUMARATE [SEROquel] 400 mg PO HS


   Naproxen Sodium [Aleve] 220 mg PO Q12HR PRN


     PRN Reason: Pain


   Multivitamins, Thera [Multivitamin (formulary)] 1 tab PO DAILY


   L.acidoph,Paracasei, B.lactis [Probiotic] 1 cap PO DAILY


   DULoxetine HCL [Cymbalta] 30 mg PO DAILY


   hydrOXYzine PAMOATE [Vistaril] 400 mg PO Q4H PRN


     PRN Reason: Anxiety


   Hydrocodone/Acetaminophen [Norco 5-325 Tablet] 1 tab PO Q4H PRN


     PRN Reason: Pain


   Furosemide [Lasix] 20 mg PO MOWEFR


Discharge Medication List





traZODone  mg PO HS 07/17/16 [History]


Divalproex Sodium [Depakote] 500 mg PO BID 04/30/17 [History]


Linaclotide [Linzess] 290 mcg PO QAM 04/30/17 [History]


traMADol HCl [Ultram] 50 mg PO DAILY PRN 04/30/17 [History]


Aspirin [Adult Low Dose Aspirin EC] 81 mg PO DAILY 05/02/17 [History]


L.acidoph,Paracasei, B.lactis [Probiotic] 1 cap PO DAILY 05/06/17 [History]


Multivitamins, Thera [Multivitamin (formulary)] 1 tab PO DAILY 05/06/17 [History

]


Naproxen Sodium [Aleve] 220 mg PO Q12HR PRN 05/06/17 [History]


QUEtiapine FUMARATE [SEROquel] 400 mg PO HS 05/06/17 [History]


DULoxetine HCL [Cymbalta] 30 mg PO DAILY 06/07/17 [History]


Furosemide [Lasix] 20 mg PO MOWEFR 06/07/17 [History]


Hydrocodone/Acetaminophen [Norco 5-325 Tablet] 1 tab PO Q4H PRN 06/07/17 [

History]


hydrOXYzine PAMOATE [Vistaril] 400 mg PO Q4H PRN 06/07/17 [History]


Aspirin 325 mg PO BID #60 tab 06/09/17 [Rx]


Cephalexin [Keflex] 500 mg PO Q8HR #15 cap 06/09/17 [Rx]


HYDROcodone/APAP 5-325MG [Norco 5] 1 - 2 each PO Q4-6H PRN #40 tab 06/09/17 [Rx]


Sennosides-Docusate Sodium [Senokot-S] 2 tab PO DAILY #30 tablet 06/09/17 [Rx]








Follow up Appointment(s)/Referral(s): 


Abdoulaye Carranza DO [Doctor of Osteopathic Medicine] - 10 Days


Activity/Diet/Wound Care/Special Instructions: 


Keep splint clean, dry, and intact


Non-weightbearing to the right leg


Follow up with Dr. Carranza in 10 days





Call [facility] with any questions or concerns, [phone]


Discharge Disposition: HOME WITH HOME HEALTH SERVICES

## 2017-06-11 NOTE — PN
DATE OF SERVICE:  06/10/2017



PRESENTING COMPLAINT: Ankle surgery. 



INTERVAL HISTORY: The patient is status post right ankle surgery much 

more perky today though has got some pain. Did tolerate a diet. No 

nausea or vomiting. No new issues. Patient does have help from her 

daughter at home she states. 



Review of systems done for constitutional, cardiovascular, GI, 

pulmonary, musculoskeletal; relevant findings as above.  



Current medications are reviewed. 



On examination, temperature 96.9, pulse not  noted. Respiration 18, 

blood pressure 106/79, pulse ox 94% on room air.  

General appearance:  Sitting up in bed, far more awake and perky. 

NECK: JVD not raised. Mass not palpable. 

RESPIRATORY: Effort normal. 

LUNGS: Decreased breath sounds. 

CARDIOVASCULAR: First and second sounds normal. No edema. 

ABDOMEN: Soft, nontender. Liver and spleen not palpable.   

PSYCH: Alert and oriented x3. Mood and affect normal. Right ankle in a 

dressing.  



INVESTIGATIONS: White count 6.3. 



ASSESSMENT:

1. Right ankle surgery. 

2. Chronic seizure disorder. 

3. Bipolar disorder in remission. 

4. Emphysema in a smoker. 

5. Chronic nicotine dependence. Patient is a smoker. 



PLAN: Care was discussed with the patient.  If discharged, the patient 

is to followed up with the family doctor.  



Thank you Dr. Carranza.

## 2019-09-03 ENCOUNTER — HOSPITAL ENCOUNTER (EMERGENCY)
Dept: HOSPITAL 47 - EC | Age: 59
Discharge: HOME | End: 2019-09-03
Payer: MEDICARE

## 2019-09-03 VITALS
RESPIRATION RATE: 16 BRPM | TEMPERATURE: 98.4 F | DIASTOLIC BLOOD PRESSURE: 75 MMHG | HEART RATE: 64 BPM | SYSTOLIC BLOOD PRESSURE: 118 MMHG

## 2019-09-03 DIAGNOSIS — N39.0: Primary | ICD-10-CM

## 2019-09-03 DIAGNOSIS — Z88.5: ICD-10-CM

## 2019-09-03 DIAGNOSIS — F31.9: ICD-10-CM

## 2019-09-03 DIAGNOSIS — F41.9: ICD-10-CM

## 2019-09-03 DIAGNOSIS — F17.210: ICD-10-CM

## 2019-09-03 DIAGNOSIS — Z79.899: ICD-10-CM

## 2019-09-03 LAB
ALBUMIN SERPL-MCNC: 4.6 G/DL (ref 3.5–5)
ALP SERPL-CCNC: 87 U/L (ref 38–126)
ALT SERPL-CCNC: 14 U/L (ref 9–52)
ANION GAP SERPL CALC-SCNC: 11 MMOL/L
AST SERPL-CCNC: 31 U/L (ref 14–36)
BASOPHILS # BLD MANUAL: 0.06 K/UL (ref 0–0.2)
BUN SERPL-SCNC: 10 MG/DL (ref 7–17)
CALCIUM SPEC-MCNC: 9.7 MG/DL (ref 8.4–10.2)
CELLS COUNTED: 100
CHLORIDE SERPL-SCNC: 101 MMOL/L (ref 98–107)
CO2 SERPL-SCNC: 25 MMOL/L (ref 22–30)
EOSINOPHIL # BLD MANUAL: 0.06 K/UL (ref 0–0.7)
ERYTHROCYTE [DISTWIDTH] IN BLOOD BY AUTOMATED COUNT: 4.88 M/UL (ref 3.8–5.4)
ERYTHROCYTE [DISTWIDTH] IN BLOOD: 14.5 % (ref 11.5–15.5)
GLUCOSE SERPL-MCNC: 91 MG/DL (ref 74–99)
HCT VFR BLD AUTO: 46.3 % (ref 34–46)
HGB BLD-MCNC: 15.2 GM/DL (ref 11.4–16)
HYALINE CASTS UR QL AUTO: 84 /LPF (ref 0–2)
LYMPHOCYTES # BLD MANUAL: 2.3 K/UL (ref 1–4.8)
MAGNESIUM SPEC-SCNC: 1.9 MG/DL (ref 1.6–2.3)
MCH RBC QN AUTO: 31.2 PG (ref 25–35)
MCHC RBC AUTO-ENTMCNC: 32.9 G/DL (ref 31–37)
MCV RBC AUTO: 94.8 FL (ref 80–100)
METAMYELOCYTES # BLD: 0.06 K/UL
MONOCYTES # BLD MANUAL: 0.22 K/UL (ref 0–1)
NEUTROPHILS NFR BLD MANUAL: 51 %
PH UR: 6 [PH] (ref 5–8)
PLATELET # BLD AUTO: 334 K/UL (ref 150–450)
POTASSIUM SERPL-SCNC: 4.1 MMOL/L (ref 3.5–5.1)
PROT SERPL-MCNC: 8.3 G/DL (ref 6.3–8.2)
PROT UR QL: (no result)
RBC UR QL: 8 /HPF (ref 0–5)
SODIUM SERPL-SCNC: 137 MMOL/L (ref 137–145)
SP GR UR: 1.02 (ref 1–1.03)
SQUAMOUS UR QL AUTO: 36 /HPF (ref 0–4)
UROBILINOGEN UR QL STRIP: 3 MG/DL (ref ?–2)
WBC # BLD AUTO: 5.6 K/UL (ref 3.8–10.6)
WBC #/AREA URNS HPF: >182 /HPF (ref 0–5)

## 2019-09-03 PROCEDURE — 84100 ASSAY OF PHOSPHORUS: CPT

## 2019-09-03 PROCEDURE — 85025 COMPLETE CBC W/AUTO DIFF WBC: CPT

## 2019-09-03 PROCEDURE — 81001 URINALYSIS AUTO W/SCOPE: CPT

## 2019-09-03 PROCEDURE — 99285 EMERGENCY DEPT VISIT HI MDM: CPT

## 2019-09-03 PROCEDURE — 36415 COLL VENOUS BLD VENIPUNCTURE: CPT

## 2019-09-03 PROCEDURE — 96361 HYDRATE IV INFUSION ADD-ON: CPT

## 2019-09-03 PROCEDURE — 96374 THER/PROPH/DIAG INJ IV PUSH: CPT

## 2019-09-03 PROCEDURE — 80053 COMPREHEN METABOLIC PANEL: CPT

## 2019-09-03 PROCEDURE — 83735 ASSAY OF MAGNESIUM: CPT

## 2019-09-03 PROCEDURE — 96375 TX/PRO/DX INJ NEW DRUG ADDON: CPT

## 2019-09-03 NOTE — ED
Weakness HPI





- General


Stated complaint: low sodium


Time Seen by Provider: 09/03/19 15:29


Source: RN notes reviewed, old records reviewed





- History of Present Illness


Initial comments: 





This is a 5-year-old female the ER for evaluation she presents today for 

evaluation regards to weakness.  Patient has history of low sodium from unknown 

hormonal deficiency.   does see nephrology.  Patient states she is just 

been feeling weak for a few days now increasingly worsening with nausea no 

vomiting.  No diarrhea.  No significant current abdominal pain no fevers.  No 

recent change in medications no illicit drug use or alcohol use.  Patient states

her significant weakness is increasing the nausea and inability to drink 

increasing her symptoms.  Patient has had prior history of similar symptoms low 

sodium levels


MD Complaint: generalized weakness


-: days(s)


Location: generalized


Severity: mild


Severity scale (1-10): 3


Quality: aching


Consistency: constant


Improves with: none


Worsens with: none


Context: history of similar


Associated Symptoms: denies other symptoms





- Related Data


                                Home Medications











 Medication  Instructions  Recorded  Confirmed


 


Multivitamins, Thera [Multivitamin 1 tab PO DAILY 05/06/17 09/03/19





(formulary)]   


 


Alendronate Sodium [Fosamax] 70 mg PO GIL 09/03/19 09/03/19


 


LORazepam [Ativan] 1 mg PO DAILY PRN 09/03/19 09/03/19


 


LORazepam [Ativan] 2 mg PO HS 09/03/19 09/03/19


 


Omeprazole [PriLOSEC] 20 mg PO DAILY 09/03/19 09/03/19


 


Ondansetron [Zofran] 4 mg PO Q8H PRN 09/03/19 09/03/19


 


QUEtiapine [SEROquel] 100 mg PO HS 09/03/19 09/03/19


 


Sertraline [Zoloft] 100 mg PO DAILY 09/03/19 09/03/19


 


Sodium Bicarbonate Tab 650 mg PO BID 09/03/19 09/03/19


 


traZODone  mg PO HS 09/03/19 09/03/19








                                  Previous Rx's











 Medication  Instructions  Recorded


 


Nitrofurantoin Monohyd/M-Cryst 100 mg PO Q12HR #14 cap 09/03/19





[Macrobid]  











                                    Allergies











Allergy/AdvReac Type Severity Reaction Status Date / Time


 


codeine Allergy  Rash/Hives Verified 09/03/19 15:59














Review of Systems


ROS Statement: 


Those systems with pertinent positive or pertinent negative responses have been 

documented in the HPI.





ROS Other: All systems not noted in ROS Statement are negative.





Past Medical History


Past Medical History: Seizure Disorder


Additional Past Medical History / Comment(s): anxiety, depression/bipolar, bowel

 obstruction hx of iatraogenic bowel injury during gynecological case/adhesions,

 constipation,emphysema. Last seizure july of 2016


History of Any Multi-Drug Resistant Organisms: None Reported


Past Surgical History: Appendectomy, Bowel Resection, Cholecystectomy, 

Hysterectomy


Additional Past Surgical History / Comment(s): rotator cuff repair bilateral  

D&C X5


Past Anesthesia/Blood Transfusion Reactions: Postoperative Nausea & Vomiting 

(PONV)


Past Psychological History: Anxiety, Bipolar, Depression


Additional Psychological History / Comment(s): at time of admission pt denies 

having any problem with depression


Smoking Status: Current every day smoker


Past Alcohol Use History: None Reported


Additional Past Alcohol Use History / Comment(s): pt states she is trying to qu

it smoking and is currently smoking 2 ppd


Past Drug Use History: None Reported


Additional Drug Use History / Comment(s): smoked last 1-21-16





- Past Family History


  ** Father


Family Medical History: Hypertension





  ** Mother


Additional Family Medical History / Comment(s): Macular degeneration





General Exam


General appearance: alert, in no apparent distress


Head exam: Present: atraumatic, normocephalic, normal inspection


Eye exam: Present: normal appearance, PERRL, EOMI.  Absent: scleral icterus, 

conjunctival injection, periorbital swelling


ENT exam: Present: normal exam, mucous membranes moist


Neck exam: Present: normal inspection.  Absent: tenderness, meningismus, 

lymphadenopathy


Respiratory exam: Present: normal lung sounds bilaterally.  Absent: respiratory 

distress, wheezes, rales, rhonchi, stridor


Cardiovascular Exam: Present: regular rate, normal rhythm, normal heart sounds. 

 Absent: systolic murmur, diastolic murmur, rubs, gallop, clicks


GI/Abdominal exam: Present: soft, normal bowel sounds.  Absent: distended, 

tenderness, guarding, rebound, rigid


Extremities exam: Present: normal inspection, full ROM, normal capillary refill.

  Absent: tenderness, pedal edema, joint swelling, calf tenderness


Back exam: Present: normal inspection


Neurological exam: Present: alert, oriented X3, CN II-XII intact


Psychiatric exam: Present: normal affect, normal mood


Skin exam: Present: warm, dry, intact, normal color.  Absent: rash





Course


                                   Vital Signs











  09/03/19





  15:43


 


Temperature 98.2 F


 


Pulse Rate 87


 


Respiratory 18





Rate 


 


Blood Pressure 109/73


 


O2 Sat by Pulse 98





Oximetry 














- Reevaluation(s)


Reevaluation #1: 





09/03/19 16:55


Record is reviewed


Reevaluation #2: 





09/03/19 16:56


electrolytes look great patient is feeling better





Medical Decision Making





- Medical Decision Making





59 female the ER for evaluation of weakness.  Patient is found to have urinary 

tract infection and electronically signed normal patient can be discharged home





- Lab Data


Result diagrams: 


                                 09/03/19 15:50





                                 09/03/19 15:50


                                   Lab Results











  09/03/19 09/03/19 09/03/19 Range/Units





  15:50 15:50 15:50 


 


WBC  5.6    (3.8-10.6)  k/uL


 


RBC  4.88    (3.80-5.40)  m/uL


 


Hgb  15.2    (11.4-16.0)  gm/dL


 


Hct  46.3 H    (34.0-46.0)  %


 


MCV  94.8    (80.0-100.0)  fL


 


MCH  31.2    (25.0-35.0)  pg


 


MCHC  32.9    (31.0-37.0)  g/dL


 


RDW  14.5    (11.5-15.5)  %


 


Plt Count  334    (150-450)  k/uL


 


Sodium   137   (137-145)  mmol/L


 


Potassium   4.1   (3.5-5.1)  mmol/L


 


Chloride   101   ()  mmol/L


 


Carbon Dioxide   25   (22-30)  mmol/L


 


Anion Gap   11   mmol/L


 


BUN   10   (7-17)  mg/dL


 


Creatinine   0.86   (0.52-1.04)  mg/dL


 


Est GFR (CKD-EPI)AfAm   86   (>60 ml/min/1.73 sqM)  


 


Est GFR (CKD-EPI)NonAf   74   (>60 ml/min/1.73 sqM)  


 


Glucose   91   (74-99)  mg/dL


 


Calcium   9.7   (8.4-10.2)  mg/dL


 


Phosphorus   3.7   (2.5-4.5)  mg/dL


 


Magnesium   1.9   (1.6-2.3)  mg/dL


 


Total Bilirubin   0.4   (0.2-1.3)  mg/dL


 


AST   31   (14-36)  U/L


 


ALT   14   (9-52)  U/L


 


Alkaline Phosphatase   87   ()  U/L


 


Total Protein   8.3 H   (6.3-8.2)  g/dL


 


Albumin   4.6   (3.5-5.0)  g/dL


 


Urine Color    Yellow  


 


Urine Appearance    Cloudy H  (Clear)  


 


Urine pH    6.0  (5.0-8.0)  


 


Ur Specific Gravity    1.024  (1.001-1.035)  


 


Urine Protein    1+ H  (Negative)  


 


Urine Glucose (UA)    Negative  (Negative)  


 


Urine Ketones    Negative  (Negative)  


 


Urine Blood    Small H  (Negative)  


 


Urine Nitrite    Negative  (Negative)  


 


Urine Bilirubin    Negative  (Negative)  


 


Urine Urobilinogen    3.0  (<2.0)  mg/dL


 


Ur Leukocyte Esterase    Large H  (Negative)  


 


Urine RBC    8 H  (0-5)  /hpf


 


Urine WBC    >182 H  (0-5)  /hpf


 


Ur Squamous Epith Cells    36 H  (0-4)  /hpf


 


Amorphous Sediment    Occasional H  (None)  /hpf


 


Urine Bacteria    Many H  (None)  /hpf


 


Hyaline Casts    84 H  (0-2)  /lpf


 


Urine Mucus    Many H  (None)  /hpf














Disposition


Clinical Impression: 


 UTI (urinary tract infection)





Disposition: HOME SELF-CARE


Condition: Good


Instructions (If sedation given, give patient instructions):  Urinary Tract 

Infection in Women (ED)


Prescriptions: 


Nitrofurantoin Monohyd/M-Cryst [Macrobid] 100 mg PO Q12HR #14 cap


Is patient prescribed a controlled substance at d/c from ED?: No


Referrals: 


Miriam Thompson MD [Primary Care Provider] - 1-2 days

## 2020-01-24 ENCOUNTER — HOSPITAL ENCOUNTER (EMERGENCY)
Dept: HOSPITAL 47 - EC | Age: 60
Discharge: HOME | End: 2020-01-24
Payer: MEDICARE

## 2020-01-24 VITALS — DIASTOLIC BLOOD PRESSURE: 73 MMHG | SYSTOLIC BLOOD PRESSURE: 122 MMHG

## 2020-01-24 VITALS
SYSTOLIC BLOOD PRESSURE: 109 MMHG | RESPIRATION RATE: 18 BRPM | HEART RATE: 86 BPM | DIASTOLIC BLOOD PRESSURE: 73 MMHG | TEMPERATURE: 97.8 F

## 2020-01-24 VITALS — RESPIRATION RATE: 18 BRPM | TEMPERATURE: 97.8 F

## 2020-01-24 VITALS — HEART RATE: 77 BPM

## 2020-01-24 DIAGNOSIS — G40.909: ICD-10-CM

## 2020-01-24 DIAGNOSIS — F17.200: ICD-10-CM

## 2020-01-24 DIAGNOSIS — F41.9: ICD-10-CM

## 2020-01-24 DIAGNOSIS — R42: Primary | ICD-10-CM

## 2020-01-24 DIAGNOSIS — F31.9: ICD-10-CM

## 2020-01-24 DIAGNOSIS — Z88.5: ICD-10-CM

## 2020-01-24 DIAGNOSIS — Z79.899: ICD-10-CM

## 2020-01-24 LAB
ALBUMIN SERPL-MCNC: 4.6 G/DL (ref 3.5–5)
ALP SERPL-CCNC: 97 U/L (ref 38–126)
ALT SERPL-CCNC: 14 U/L (ref 4–34)
ANION GAP SERPL CALC-SCNC: 10 MMOL/L
APTT BLD: 24.3 SEC (ref 22–30)
AST SERPL-CCNC: 35 U/L (ref 14–36)
BASOPHILS # BLD AUTO: 0.1 K/UL (ref 0–0.2)
BASOPHILS NFR BLD AUTO: 1 %
BUN SERPL-SCNC: 12 MG/DL (ref 7–17)
CALCIUM SPEC-MCNC: 9.3 MG/DL (ref 8.4–10.2)
CHLORIDE SERPL-SCNC: 101 MMOL/L (ref 98–107)
CO2 SERPL-SCNC: 23 MMOL/L (ref 22–30)
EOSINOPHIL # BLD AUTO: 0.1 K/UL (ref 0–0.7)
EOSINOPHIL NFR BLD AUTO: 1 %
ERYTHROCYTE [DISTWIDTH] IN BLOOD BY AUTOMATED COUNT: 4.13 M/UL (ref 3.8–5.4)
ERYTHROCYTE [DISTWIDTH] IN BLOOD: 13.1 % (ref 11.5–15.5)
GLUCOSE BLD-MCNC: 98 MG/DL (ref 75–99)
GLUCOSE SERPL-MCNC: 91 MG/DL (ref 74–99)
HCT VFR BLD AUTO: 39.7 % (ref 34–46)
HGB BLD-MCNC: 13 GM/DL (ref 11.4–16)
INR PPP: 0.9 (ref ?–1.2)
LYMPHOCYTES # SPEC AUTO: 1.6 K/UL (ref 1–4.8)
LYMPHOCYTES NFR SPEC AUTO: 23 %
MAGNESIUM SPEC-SCNC: 1.7 MG/DL (ref 1.6–2.3)
MCH RBC QN AUTO: 31.5 PG (ref 25–35)
MCHC RBC AUTO-ENTMCNC: 32.8 G/DL (ref 31–37)
MCV RBC AUTO: 96.1 FL (ref 80–100)
MONOCYTES # BLD AUTO: 0.2 K/UL (ref 0–1)
MONOCYTES NFR BLD AUTO: 3 %
NEUTROPHILS # BLD AUTO: 4.7 K/UL (ref 1.3–7.7)
NEUTROPHILS NFR BLD AUTO: 70 %
PLATELET # BLD AUTO: 308 K/UL (ref 150–450)
POTASSIUM SERPL-SCNC: 4.3 MMOL/L (ref 3.5–5.1)
PROT SERPL-MCNC: 8 G/DL (ref 6.3–8.2)
PT BLD: 9.6 SEC (ref 9–12)
SODIUM SERPL-SCNC: 134 MMOL/L (ref 137–145)
WBC # BLD AUTO: 6.6 K/UL (ref 3.8–10.6)

## 2020-01-24 PROCEDURE — 96360 HYDRATION IV INFUSION INIT: CPT

## 2020-01-24 PROCEDURE — 83735 ASSAY OF MAGNESIUM: CPT

## 2020-01-24 PROCEDURE — 93005 ELECTROCARDIOGRAM TRACING: CPT

## 2020-01-24 PROCEDURE — 80053 COMPREHEN METABOLIC PANEL: CPT

## 2020-01-24 PROCEDURE — 85730 THROMBOPLASTIN TIME PARTIAL: CPT

## 2020-01-24 PROCEDURE — 99283 EMERGENCY DEPT VISIT LOW MDM: CPT

## 2020-01-24 PROCEDURE — 36415 COLL VENOUS BLD VENIPUNCTURE: CPT

## 2020-01-24 PROCEDURE — 99285 EMERGENCY DEPT VISIT HI MDM: CPT

## 2020-01-24 PROCEDURE — 84484 ASSAY OF TROPONIN QUANT: CPT

## 2020-01-24 PROCEDURE — 85610 PROTHROMBIN TIME: CPT

## 2020-01-24 PROCEDURE — 71046 X-RAY EXAM CHEST 2 VIEWS: CPT

## 2020-01-24 PROCEDURE — 96361 HYDRATE IV INFUSION ADD-ON: CPT

## 2020-01-24 PROCEDURE — 85025 COMPLETE CBC W/AUTO DIFF WBC: CPT

## 2020-01-24 NOTE — XR
EXAMINATION TYPE: XR chest 2V

 

DATE OF EXAM: 1/24/2020

 

COMPARISON: Chest x-ray January 16, 2016

 

HISTORY: History of seizure with syncope and weakness.

 

TECHNIQUE:  Frontal and lateral views of the chest are obtained.

 

FINDINGS:  There is chronic parenchymal changes in both bases without suspicious new focal air space 
opacity, pleural effusion, or pneumothorax seen.  The cardiac silhouette size is within upper limits 
of normal with atherosclerotic change in thoracic aorta.   The osseous structures are intact.

 

IMPRESSION:  Patchy bibasilar linear scarring and/or atelectasis remains present. No new focal infilt
rate seen.

## 2020-01-24 NOTE — ED
General Adult HPI





- General


Chief complaint: Dizziness


Stated complaint: Dizzy


Time Seen by Provider: 01/24/20 17:20


Source: patient


Mode of arrival: ambulatory


Limitations: no limitations





- History of Present Illness


Initial comments: 


59-year-old female patient presents to the emergency department today for 

evaluation of dizziness.  The patient was seen and evaluated in the emergency 

department today, discharge to the waiting room.  When she stood up from her 

waiting room chair she became dizzy again and requested to be reevaluated.  

Patient had an episode of dizziness prior to coming in today.  Patient has 

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 

episode.  Patient denies any recent head injury.  Denies chest pain or shortness

of breath.  Denies numbness, tingling, weakness or extremities.  She denies any 

new symptoms since being discharged. Patient denies any recent rash, shortness 

breath, chest pain, abdominal pain, nausea, vomiting, diarrhea, constipation, 

back pain, numbness, tingling, hematuria, dysuria, urinary urgency, urinary 

frequency, headache, visual changes, or any other complaints.








- Related Data


                                Home Medications











 Medication  Instructions  Recorded  Confirmed


 


Multivitamins, Thera [Multivitamin 1 tab PO DAILY 05/06/17 09/03/19





(formulary)]   


 


Alendronate Sodium [Fosamax] 70 mg PO GIL 09/03/19 09/03/19


 


LORazepam [Ativan] 1 mg PO DAILY PRN 09/03/19 09/03/19


 


LORazepam [Ativan] 2 mg PO HS 09/03/19 09/03/19


 


Omeprazole [PriLOSEC] 20 mg PO DAILY 09/03/19 09/03/19


 


Ondansetron [Zofran] 4 mg PO Q8H PRN 09/03/19 09/03/19


 


QUEtiapine [SEROquel] 100 mg PO HS 09/03/19 09/03/19


 


Sertraline [Zoloft] 100 mg PO DAILY 09/03/19 09/03/19


 


Sodium Bicarbonate Tab 650 mg PO BID 09/03/19 09/03/19


 


traZODone  mg PO HS 09/03/19 09/03/19








                                  Previous Rx's











 Medication  Instructions  Recorded


 


Nitrofurantoin Monohyd/M-Cryst 100 mg PO Q12HR #14 cap 09/03/19





[Macrobid]  











                                    Allergies











Allergy/AdvReac Type Severity Reaction Status Date / Time


 


codeine Allergy  Rash/Hives Verified 01/24/20 17:08














Review of Systems


ROS Statement: 


Those systems with pertinent positive or pertinent negative responses have been 

documented in the HPI.





ROS Other: All systems not noted in ROS Statement are negative.





Past Medical History


Past Medical History: Seizure Disorder


Additional Past Medical History / Comment(s): anxiety, depression/bipolar, bowel

 obstruction hx of iatraogenic bowel injury during gynecological case/adhesions,

 constipation, emphysema. Last seizure july of 2016


History of Any Multi-Drug Resistant Organisms: None Reported


Past Surgical History: Appendectomy, Bowel Resection, Cholecystectomy, Hystere

ctomy


Additional Past Surgical History / Comment(s): rotator cuff repair bilateral  

D&C X5


Past Anesthesia/Blood Transfusion Reactions: Postoperative Nausea & Vomiting 

(PONV)


Past Psychological History: Anxiety, Bipolar, Depression


Smoking Status: Current every day smoker


Past Alcohol Use History: None Reported


Past Drug Use History: Marijuana





- Past Family History


  ** Father


Family Medical History: Hypertension





  ** Mother


Additional Family Medical History / Comment(s): Macular degeneration





General Exam


Limitations: no limitations


General appearance: alert, in no apparent distress, other (This is a well-

developed, well-nourished adult female patient in no acute distress.  Vital 

signs upon presentation are temperature 97.8F, pulse 86, respirations 18, blood

 pressure 109/73, pulse ox 95% on room air.)


Eye exam: Present: normal appearance, PERRL, EOMI.  Absent: scleral icterus, 

conjunctival injection, nystagmus, periorbital swelling


ENT exam: Present: normal exam, normal oropharynx, mucous membranes moist


Respiratory exam: Present: normal lung sounds bilaterally.  Absent: respiratory 

distress, wheezes, rales, rhonchi, stridor


Cardiovascular Exam: Present: regular rate, normal rhythm, normal heart sounds. 

 Absent: systolic murmur, diastolic murmur, rubs, gallop, clicks


GI/Abdominal exam: Present: soft, normal bowel sounds.  Absent: distended, 

tenderness, guarding, rebound, rigid


Neurological exam: Present: alert, oriented X3, CN II-XII intact, other 

(Strength in all 4 extremities is 5/5.)


Psychiatric exam: Present: normal affect, normal mood


Skin exam: Present: warm, dry, intact, normal color.  Absent: rash





Course


                                   Vital Signs











  01/24/20





  17:08


 


Temperature 97.8 F


 


Pulse Rate 86


 


Respiratory 18





Rate 


 


Blood Pressure 109/73


 


O2 Sat by Pulse 95





Oximetry 














Medical Decision Making





- Medical Decision Making


59-year-old female patient is brought to the emergency department today for 

evaluation of dizziness.  Physical examination was unremarkable.  Patient was 

just discharged from the emergency department.  Did review labs and imaging 

which were unremarkable.  I discussed findings with the patient.  She'll be 

discharged to follow-up with the primary care physician for recheck in 1-2 days.

  Return parameters were discussed in detail.  She verbalizes understanding and 

agrees with this plan.








Disposition


Clinical Impression: 


 Dizziness





Disposition: HOME SELF-CARE


Condition: Good


Instructions (If sedation given, give patient instructions):  Dizziness (ED)


Additional Instructions: 


Follow-up with your primary care physician for recheck in 1-2 days.  Return to 

emergency department immediately for any new, worsening, or concerning symptoms.


Is patient prescribed a controlled substance at d/c from ED?: No


Referrals: 


Miriam Thompson MD [Primary Care Provider] - 1-2 days


Time of Disposition: 17:44

## 2020-01-24 NOTE — ED
General Adult HPI





- General


Chief complaint: Dizziness


Stated complaint: Lightheadedness


Time Seen by Provider: 01/24/20 13:21


Source: patient, EMS, RN notes reviewed


Mode of arrival: EMS


Limitations: no limitations





- History of Present Illness


Initial comments: 





Patient is a pleasant 59-year-old female presenting to the emergency Department 

with an episode of lightheadedness.  Symptoms lasted around 15 minutes then 

resolved.  Patient felt if symptoms worsen she may pass out.  Patient never 

passed out or lost consciousness.  Patient did lower her self down to the 

ground.  No headache.  No confusion.  Patient does have some mild nausea.  No 

chest pain or dyspnea.  No isolated area of weakness.  No confusion.  Patient 

does have history of similar symptoms previously associated with hyponatremia 

secondary to SIADH.





- Related Data


                                Home Medications











 Medication  Instructions  Recorded  Confirmed


 


Multivitamins, Thera [Multivitamin 1 tab PO DAILY 05/06/17 09/03/19





(formulary)]   


 


Alendronate Sodium [Fosamax] 70 mg PO GIL 09/03/19 09/03/19


 


LORazepam [Ativan] 1 mg PO DAILY PRN 09/03/19 09/03/19


 


LORazepam [Ativan] 2 mg PO HS 09/03/19 09/03/19


 


Omeprazole [PriLOSEC] 20 mg PO DAILY 09/03/19 09/03/19


 


Ondansetron [Zofran] 4 mg PO Q8H PRN 09/03/19 09/03/19


 


QUEtiapine [SEROquel] 100 mg PO HS 09/03/19 09/03/19


 


Sertraline [Zoloft] 100 mg PO DAILY 09/03/19 09/03/19


 


Sodium Bicarbonate Tab 650 mg PO BID 09/03/19 09/03/19


 


traZODone  mg PO HS 09/03/19 09/03/19








                                  Previous Rx's











 Medication  Instructions  Recorded


 


Nitrofurantoin Monohyd/M-Cryst 100 mg PO Q12HR #14 cap 09/03/19





[Macrobid]  











                                    Allergies











Allergy/AdvReac Type Severity Reaction Status Date / Time


 


codeine Allergy  Rash/Hives Verified 01/24/20 13:23














Review of Systems


ROS Statement: 


Those systems with pertinent positive or pertinent negative responses have been 

documented in the HPI.





ROS Other: All systems not noted in ROS Statement are negative.


Constitutional: Denies: fever


Eyes: Denies: eye pain


ENT: Denies: ear pain


Respiratory: Denies: cough, dyspnea


Cardiovascular: Denies: chest pain


Endocrine: Denies: fatigue


Gastrointestinal: Reports: nausea.  Denies: abdominal pain, vomiting


Genitourinary: Denies: dysuria


Musculoskeletal: Denies: back pain


Skin: Denies: rash


Neurological: Denies: headache, weakness, confusion





Past Medical History


Past Medical History: Seizure Disorder


Additional Past Medical History / Comment(s): anxiety, depression/bipolar, bowel

 obstruction hx of iatraogenic bowel injury during gynecological case/adhesions,

 constipation, emphysema. Last seizure july of 2016


History of Any Multi-Drug Resistant Organisms: None Reported


Past Surgical History: Appendectomy, Bowel Resection, Cholecystectomy, 

Hysterectomy


Additional Past Surgical History / Comment(s): rotator cuff repair bilateral  

D&C X5


Past Anesthesia/Blood Transfusion Reactions: Postoperative Nausea & Vomiting 

(PONV)


Past Psychological History: Anxiety, Bipolar, Depression


Smoking Status: Current every day smoker


Past Alcohol Use History: None Reported


Past Drug Use History: Marijuana





- Past Family History


  ** Father


Family Medical History: Hypertension





  ** Mother


Additional Family Medical History / Comment(s): Macular degeneration





General Exam


Limitations: no limitations


General appearance: alert, in no apparent distress


Head exam: Present: normocephalic


Eye exam: Present: normal appearance, PERRL, EOMI.  Absent: nystagmus


ENT exam: Present: normal oropharynx


Neck exam: Present: normal inspection


Respiratory exam: Present: normal lung sounds bilaterally


Cardiovascular Exam: Present: regular rate, normal rhythm, normal heart sounds


GI/Abdominal exam: Present: soft.  Absent: tenderness


Extremities exam: Present: normal inspection.  Absent: pedal edema, calf 

tenderness


Neurological exam: Present: alert, oriented X3, CN II-XII intact.  Absent: motor

 sensory deficit


  ** Expanded


Neurological exam: Present: protecting the airway


Patient oriented to: Present: person, place, time


Speech: Present: fluid speech


Motor strength exam: RUE: 5, LUE: 5, RLE: 5, LLE: 5


Eye Response: (4) open spontaneously


Motor Response: (6) obeys commands


Verbal Response: (5) oriented


Psychiatric exam: Present: normal affect, normal mood


Skin exam: Present: normal color





Course


                                   Vital Signs











  01/24/20 01/24/20





  13:23 14:26


 


Temperature 97.8 F 


 


Pulse Rate 74 77


 


Respiratory 18 18





Rate  


 


Blood Pressure 105/76 113/77


 


O2 Sat by Pulse 98 98





Oximetry  














EKG Findings





- EKG Comments:


EKG Findings:: Normal sinus rhythm 69.  .  QRS 86.  .  .  

Normal axis.  Normal QRS.  No acute ST change.





Medical Decision Making





- Medical Decision Making





Patient reevaluated and remained symptom-free.  Patient was able to get up and 

use the restroom and walk around more than once without difficulty.  Patient 

updated on results and need for follow-up.





- Lab Data


Result diagrams: 


                                 01/24/20 13:55





                                 01/24/20 13:55


                                   Lab Results











  01/24/20 01/24/20 01/24/20 Range/Units





  13:39 13:55 13:55 


 


WBC   6.6   (3.8-10.6)  k/uL


 


RBC   4.13   (3.80-5.40)  m/uL


 


Hgb   13.0   (11.4-16.0)  gm/dL


 


Hct   39.7   (34.0-46.0)  %


 


MCV   96.1   (80.0-100.0)  fL


 


MCH   31.5   (25.0-35.0)  pg


 


MCHC   32.8   (31.0-37.0)  g/dL


 


RDW   13.1   (11.5-15.5)  %


 


Plt Count   308   (150-450)  k/uL


 


Neutrophils %   70   %


 


Lymphocytes %   23   %


 


Monocytes %   3   %


 


Eosinophils %   1   %


 


Basophils %   1   %


 


Neutrophils #   4.7   (1.3-7.7)  k/uL


 


Lymphocytes #   1.6   (1.0-4.8)  k/uL


 


Monocytes #   0.2   (0-1.0)  k/uL


 


Eosinophils #   0.1   (0-0.7)  k/uL


 


Basophils #   0.1   (0-0.2)  k/uL


 


PT     (9.0-12.0)  sec


 


INR     (<1.2)  


 


APTT     (22.0-30.0)  sec


 


Sodium    134 L  (137-145)  mmol/L


 


Potassium    4.3  (3.5-5.1)  mmol/L


 


Chloride    101  ()  mmol/L


 


Carbon Dioxide    23  (22-30)  mmol/L


 


Anion Gap    10  mmol/L


 


BUN    12  (7-17)  mg/dL


 


Creatinine    0.86  (0.52-1.04)  mg/dL


 


Est GFR (CKD-EPI)AfAm    86  (>60 ml/min/1.73 sqM)  


 


Est GFR (CKD-EPI)NonAf    74  (>60 ml/min/1.73 sqM)  


 


Glucose    91  (74-99)  mg/dL


 


POC Glucose (mg/dL)  98    (75-99)  mg/dL


 


POC Glu Operater Ashley Torres    


 


Calcium    9.3  (8.4-10.2)  mg/dL


 


Magnesium    1.7  (1.6-2.3)  mg/dL


 


Total Bilirubin    0.5  (0.2-1.3)  mg/dL


 


AST    35  (14-36)  U/L


 


ALT    14  (4-34)  U/L


 


Alkaline Phosphatase    97  ()  U/L


 


Troponin I     (0.000-0.034)  ng/mL


 


Total Protein    8.0  (6.3-8.2)  g/dL


 


Albumin    4.6  (3.5-5.0)  g/dL














  01/24/20 01/24/20 Range/Units





  13:55 13:55 


 


WBC    (3.8-10.6)  k/uL


 


RBC    (3.80-5.40)  m/uL


 


Hgb    (11.4-16.0)  gm/dL


 


Hct    (34.0-46.0)  %


 


MCV    (80.0-100.0)  fL


 


MCH    (25.0-35.0)  pg


 


MCHC    (31.0-37.0)  g/dL


 


RDW    (11.5-15.5)  %


 


Plt Count    (150-450)  k/uL


 


Neutrophils %    %


 


Lymphocytes %    %


 


Monocytes %    %


 


Eosinophils %    %


 


Basophils %    %


 


Neutrophils #    (1.3-7.7)  k/uL


 


Lymphocytes #    (1.0-4.8)  k/uL


 


Monocytes #    (0-1.0)  k/uL


 


Eosinophils #    (0-0.7)  k/uL


 


Basophils #    (0-0.2)  k/uL


 


PT  9.6   (9.0-12.0)  sec


 


INR  0.9   (<1.2)  


 


APTT  24.3   (22.0-30.0)  sec


 


Sodium    (137-145)  mmol/L


 


Potassium    (3.5-5.1)  mmol/L


 


Chloride    ()  mmol/L


 


Carbon Dioxide    (22-30)  mmol/L


 


Anion Gap    mmol/L


 


BUN    (7-17)  mg/dL


 


Creatinine    (0.52-1.04)  mg/dL


 


Est GFR (CKD-EPI)AfAm    (>60 ml/min/1.73 sqM)  


 


Est GFR (CKD-EPI)NonAf    (>60 ml/min/1.73 sqM)  


 


Glucose    (74-99)  mg/dL


 


POC Glucose (mg/dL)    (75-99)  mg/dL


 


POC Glu Operater ID    


 


Calcium    (8.4-10.2)  mg/dL


 


Magnesium    (1.6-2.3)  mg/dL


 


Total Bilirubin    (0.2-1.3)  mg/dL


 


AST    (14-36)  U/L


 


ALT    (4-34)  U/L


 


Alkaline Phosphatase    ()  U/L


 


Troponin I   <0.012  (0.000-0.034)  ng/mL


 


Total Protein    (6.3-8.2)  g/dL


 


Albumin    (3.5-5.0)  g/dL














- Radiology Data


Radiology results: image reviewed (Chest x-ray shows scarring or atelectasis.  

No new focal infiltrate.)





Disposition


Clinical Impression: 


 Lightheadedness





Disposition: HOME SELF-CARE


Condition: Stable


Instructions (If sedation given, give patient instructions):  Dizziness (ED)


Additional Instructions: 


Please follow-up to primary care physician in the next day or 2 for recheck.  

Return for passing out, weakness or confusion, chest pain, worsening symptoms or

 other concerns.


Is patient prescribed a controlled substance at d/c from ED?: No


Referrals: 


Miriam Thompson MD [Primary Care Provider] - 1-2 days


Time of Disposition: 15:55